# Patient Record
Sex: MALE | Race: WHITE | NOT HISPANIC OR LATINO | URBAN - METROPOLITAN AREA
[De-identification: names, ages, dates, MRNs, and addresses within clinical notes are randomized per-mention and may not be internally consistent; named-entity substitution may affect disease eponyms.]

---

## 2021-07-03 ENCOUNTER — EMERGENCY (EMERGENCY)
Facility: HOSPITAL | Age: 61
LOS: 1 days | Discharge: HOME | End: 2021-07-03
Attending: EMERGENCY MEDICINE
Payer: COMMERCIAL

## 2021-07-03 VITALS
DIASTOLIC BLOOD PRESSURE: 92 MMHG | TEMPERATURE: 98 F | HEIGHT: 67 IN | RESPIRATION RATE: 18 BRPM | HEART RATE: 94 BPM | SYSTOLIC BLOOD PRESSURE: 164 MMHG | OXYGEN SATURATION: 99 % | WEIGHT: 164.91 LBS

## 2021-07-03 LAB
ALBUMIN SERPL ELPH-MCNC: 4.1 G/DL — SIGNIFICANT CHANGE UP (ref 3.3–5)
ALP SERPL-CCNC: 94 U/L — SIGNIFICANT CHANGE UP (ref 40–120)
ALT FLD-CCNC: 13 U/L — SIGNIFICANT CHANGE UP (ref 10–45)
ANION GAP SERPL CALC-SCNC: 12 MMOL/L — SIGNIFICANT CHANGE UP (ref 5–17)
APTT BLD: 36.1 SEC — HIGH (ref 27.5–35.5)
AST SERPL-CCNC: 12 U/L — SIGNIFICANT CHANGE UP (ref 10–40)
B BURGDOR C6 AB SER-ACNC: NEGATIVE — SIGNIFICANT CHANGE UP
B BURGDOR IGG+IGM SER-ACNC: 0.07 INDEX — SIGNIFICANT CHANGE UP (ref 0.01–0.89)
BASE EXCESS BLDV CALC-SCNC: 2.9 MMOL/L — HIGH (ref -2–2)
BASOPHILS # BLD AUTO: 0.02 K/UL — SIGNIFICANT CHANGE UP (ref 0–0.2)
BASOPHILS NFR BLD AUTO: 0.2 % — SIGNIFICANT CHANGE UP (ref 0–2)
BILIRUB SERPL-MCNC: 0.3 MG/DL — SIGNIFICANT CHANGE UP (ref 0.2–1.2)
BUN SERPL-MCNC: 17 MG/DL — SIGNIFICANT CHANGE UP (ref 7–23)
CA-I SERPL-SCNC: 1.17 MMOL/L — SIGNIFICANT CHANGE UP (ref 1.12–1.3)
CALCIUM SERPL-MCNC: 9 MG/DL — SIGNIFICANT CHANGE UP (ref 8.4–10.5)
CHLORIDE BLDV-SCNC: 107 MMOL/L — SIGNIFICANT CHANGE UP (ref 96–108)
CHLORIDE SERPL-SCNC: 106 MMOL/L — SIGNIFICANT CHANGE UP (ref 96–108)
CO2 BLDV-SCNC: 30 MMOL/L — SIGNIFICANT CHANGE UP (ref 22–30)
CO2 SERPL-SCNC: 22 MMOL/L — SIGNIFICANT CHANGE UP (ref 22–31)
CREAT SERPL-MCNC: 0.73 MG/DL — SIGNIFICANT CHANGE UP (ref 0.5–1.3)
EOSINOPHIL # BLD AUTO: 0.16 K/UL — SIGNIFICANT CHANGE UP (ref 0–0.5)
EOSINOPHIL NFR BLD AUTO: 1.5 % — SIGNIFICANT CHANGE UP (ref 0–6)
ETHANOL SERPL-MCNC: SIGNIFICANT CHANGE UP MG/DL (ref 0–10)
GAS PNL BLDV: 140 MMOL/L — SIGNIFICANT CHANGE UP (ref 135–145)
GAS PNL BLDV: SIGNIFICANT CHANGE UP
GAS PNL BLDV: SIGNIFICANT CHANGE UP
GLUCOSE BLDV-MCNC: 78 MG/DL — SIGNIFICANT CHANGE UP (ref 70–99)
GLUCOSE SERPL-MCNC: 85 MG/DL — SIGNIFICANT CHANGE UP (ref 70–99)
HCO3 BLDV-SCNC: 28 MMOL/L — SIGNIFICANT CHANGE UP (ref 21–29)
HCT VFR BLD CALC: 41.7 % — SIGNIFICANT CHANGE UP (ref 39–50)
HCT VFR BLDA CALC: 45 % — SIGNIFICANT CHANGE UP (ref 39–50)
HGB BLD CALC-MCNC: 14.6 G/DL — SIGNIFICANT CHANGE UP (ref 13–17)
HGB BLD-MCNC: 13.8 G/DL — SIGNIFICANT CHANGE UP (ref 13–17)
IMM GRANULOCYTES NFR BLD AUTO: 0.7 % — SIGNIFICANT CHANGE UP (ref 0–1.5)
INR BLD: 0.97 RATIO — SIGNIFICANT CHANGE UP (ref 0.88–1.16)
LACTATE BLDV-MCNC: 1.3 MMOL/L — SIGNIFICANT CHANGE UP (ref 0.7–2)
LYMPHOCYTES # BLD AUTO: 1.98 K/UL — SIGNIFICANT CHANGE UP (ref 1–3.3)
LYMPHOCYTES # BLD AUTO: 18.4 % — SIGNIFICANT CHANGE UP (ref 13–44)
MCHC RBC-ENTMCNC: 31.3 PG — SIGNIFICANT CHANGE UP (ref 27–34)
MCHC RBC-ENTMCNC: 33.1 GM/DL — SIGNIFICANT CHANGE UP (ref 32–36)
MCV RBC AUTO: 94.6 FL — SIGNIFICANT CHANGE UP (ref 80–100)
MONOCYTES # BLD AUTO: 0.73 K/UL — SIGNIFICANT CHANGE UP (ref 0–0.9)
MONOCYTES NFR BLD AUTO: 6.8 % — SIGNIFICANT CHANGE UP (ref 2–14)
NEUTROPHILS # BLD AUTO: 7.82 K/UL — HIGH (ref 1.8–7.4)
NEUTROPHILS NFR BLD AUTO: 72.4 % — SIGNIFICANT CHANGE UP (ref 43–77)
NRBC # BLD: 0 /100 WBCS — SIGNIFICANT CHANGE UP (ref 0–0)
PCO2 BLDV: 48 MMHG — SIGNIFICANT CHANGE UP (ref 35–50)
PH BLDV: 7.39 — SIGNIFICANT CHANGE UP (ref 7.35–7.45)
PLATELET # BLD AUTO: 265 K/UL — SIGNIFICANT CHANGE UP (ref 150–400)
PO2 BLDV: 33 MMHG — SIGNIFICANT CHANGE UP (ref 25–45)
POTASSIUM BLDV-SCNC: 4.1 MMOL/L — SIGNIFICANT CHANGE UP (ref 3.5–5.3)
POTASSIUM SERPL-MCNC: 4.2 MMOL/L — SIGNIFICANT CHANGE UP (ref 3.5–5.3)
POTASSIUM SERPL-SCNC: 4.2 MMOL/L — SIGNIFICANT CHANGE UP (ref 3.5–5.3)
PROT SERPL-MCNC: 7.4 G/DL — SIGNIFICANT CHANGE UP (ref 6–8.3)
PROTHROM AB SERPL-ACNC: 11.6 SEC — SIGNIFICANT CHANGE UP (ref 10.6–13.6)
RBC # BLD: 4.41 M/UL — SIGNIFICANT CHANGE UP (ref 4.2–5.8)
RBC # FLD: 14.9 % — HIGH (ref 10.3–14.5)
SAO2 % BLDV: 62 % — LOW (ref 67–88)
SODIUM SERPL-SCNC: 140 MMOL/L — SIGNIFICANT CHANGE UP (ref 135–145)
TROPONIN T, HIGH SENSITIVITY RESULT: <6 NG/L — SIGNIFICANT CHANGE UP (ref 0–51)
WBC # BLD: 10.79 K/UL — HIGH (ref 3.8–10.5)
WBC # FLD AUTO: 10.79 K/UL — HIGH (ref 3.8–10.5)

## 2021-07-03 PROCEDURE — 70498 CT ANGIOGRAPHY NECK: CPT | Mod: 26,MA

## 2021-07-03 PROCEDURE — 85018 HEMOGLOBIN: CPT

## 2021-07-03 PROCEDURE — 71045 X-RAY EXAM CHEST 1 VIEW: CPT

## 2021-07-03 PROCEDURE — 71045 X-RAY EXAM CHEST 1 VIEW: CPT | Mod: 26

## 2021-07-03 PROCEDURE — 86140 C-REACTIVE PROTEIN: CPT

## 2021-07-03 PROCEDURE — 93005 ELECTROCARDIOGRAM TRACING: CPT

## 2021-07-03 PROCEDURE — 80053 COMPREHEN METABOLIC PANEL: CPT

## 2021-07-03 PROCEDURE — 70496 CT ANGIOGRAPHY HEAD: CPT

## 2021-07-03 PROCEDURE — 82435 ASSAY OF BLOOD CHLORIDE: CPT

## 2021-07-03 PROCEDURE — 85014 HEMATOCRIT: CPT

## 2021-07-03 PROCEDURE — 84132 ASSAY OF SERUM POTASSIUM: CPT

## 2021-07-03 PROCEDURE — 85610 PROTHROMBIN TIME: CPT

## 2021-07-03 PROCEDURE — 84295 ASSAY OF SERUM SODIUM: CPT

## 2021-07-03 PROCEDURE — 93010 ELECTROCARDIOGRAM REPORT: CPT

## 2021-07-03 PROCEDURE — 82330 ASSAY OF CALCIUM: CPT

## 2021-07-03 PROCEDURE — 80307 DRUG TEST PRSMV CHEM ANLYZR: CPT

## 2021-07-03 PROCEDURE — 70498 CT ANGIOGRAPHY NECK: CPT

## 2021-07-03 PROCEDURE — 86618 LYME DISEASE ANTIBODY: CPT

## 2021-07-03 PROCEDURE — 82962 GLUCOSE BLOOD TEST: CPT

## 2021-07-03 PROCEDURE — 87476 LYME DIS DNA AMP PROBE: CPT

## 2021-07-03 PROCEDURE — 82803 BLOOD GASES ANY COMBINATION: CPT

## 2021-07-03 PROCEDURE — 70496 CT ANGIOGRAPHY HEAD: CPT | Mod: 26,MA

## 2021-07-03 PROCEDURE — 83605 ASSAY OF LACTIC ACID: CPT

## 2021-07-03 PROCEDURE — 99291 CRITICAL CARE FIRST HOUR: CPT | Mod: 25

## 2021-07-03 PROCEDURE — 84484 ASSAY OF TROPONIN QUANT: CPT

## 2021-07-03 PROCEDURE — 82947 ASSAY GLUCOSE BLOOD QUANT: CPT

## 2021-07-03 PROCEDURE — 85025 COMPLETE CBC W/AUTO DIFF WBC: CPT

## 2021-07-03 PROCEDURE — 85730 THROMBOPLASTIN TIME PARTIAL: CPT

## 2021-07-03 PROCEDURE — 99291 CRITICAL CARE FIRST HOUR: CPT

## 2021-07-03 PROCEDURE — 70450 CT HEAD/BRAIN W/O DYE: CPT

## 2021-07-03 RX ORDER — VALACYCLOVIR 500 MG/1
1000 TABLET, FILM COATED ORAL ONCE
Refills: 0 | Status: COMPLETED | OUTPATIENT
Start: 2021-07-03 | End: 2021-07-03

## 2021-07-03 RX ORDER — VALACYCLOVIR 500 MG/1
1 TABLET, FILM COATED ORAL
Qty: 21 | Refills: 0
Start: 2021-07-03 | End: 2021-07-09

## 2021-07-03 RX ADMIN — VALACYCLOVIR 1000 MILLIGRAM(S): 500 TABLET, FILM COATED ORAL at 12:20

## 2021-07-03 RX ADMIN — Medication 60 MILLIGRAM(S): at 12:19

## 2021-07-03 NOTE — ED PROVIDER NOTE - PROGRESS NOTE DETAILS
Stevie RUBIN (PGY-3): Discussed with patient and daughter at bedside instructions to take prescribed valtrex and prednisone and follow-up with Dr. Gusman. Patient endorses he has eye patch at home 2/2 hx of cataracts. Addressed all questions and concerns at this time. Patient stable for discharge.

## 2021-07-03 NOTE — ED PROVIDER NOTE - PATIENT PORTAL LINK FT
You can access the FollowMyHealth Patient Portal offered by [facility] by registering at the following website: [email or web address]. By joining [facility]’s FollowMyHealth portal, you will also be able to view your health information using other applications (apps) compatible with our system.

## 2021-07-03 NOTE — ED PROVIDER NOTE - ATTENDING CONTRIBUTION TO CARE
Attending Statement (HIEU Elmore MD):    HPI: 62y/o M with h/o HTN, smoker, presenting for evaluation of right sided facial droop, first noted yesterday around 11am, states awoke fine but noted he had trouble smoking a cigarette and right side of face felt weird.  Not improving since onset, so brought to ED.    Review of Systems:  -CODE STROKE    All else negative unless otherwise specified elsewhere in this note.    PSH/PMH as noted above    On Physical Exam:  General: well appearing, in NAD, speaking clearly in full sentences and without difficulty; cooperative with exam  HEENT: PERRL, airway patent. TM normal bilaterall (some cerumen; no vesicles noted b/l in aud canals)  Neck: no neck tenderness, no nuchal rigidity  Cardiac: normal s1, s2; RRR; no MGR  Lungs: CTABL  Abdomen: soft nontender/nondistended  : no bladder tenderness or distension  Skin: intact, no rash  Extremities: no peripheral edema, no gross deformities  Neuro: R facial droop, EOMI; R forehead unable to raise eyebrows/wrinkle; 5/5 str in all extremities, grosly normal sensation in all extremities    MDM: Attending Statement (HIEU Elmore MD):    HPI: 62y/o M with h/o HTN, smoker, presenting for evaluation of right sided facial droop, first noted yesterday around 11am, states awoke fine but noted he had trouble smoking a cigarette and right side of face felt weird.  Not improving since onset, so brought to ED.    Review of Systems:  -CODE STROKE    All else negative unless otherwise specified elsewhere in this note.    PSH/PMH as noted above    On Physical Exam:  General: well appearing, in NAD, speaking clearly in full sentences and without difficulty; cooperative with exam  HEENT: PERRL, airway patent. TM normal bilaterall (some cerumen; no vesicles noted b/l in aud canals)  Neck: no neck tenderness, no nuchal rigidity  Cardiac: normal s1, s2; RRR; no MGR  Lungs: CTABL  Abdomen: soft nontender/nondistended  : no bladder tenderness or distension  Skin: intact, no rash  Extremities: no peripheral edema, no gross deformities  Neuro: R facial droop (including forehead), EOMI; R forehead unable to raise eyebrows/wrinkle; 5/5 str in all extremities, grosly normal sensation in all extremities    MDM: CODE STROKE from triage; evaluated in CT with neuro/stroke team who responded immediately; CT show no evidence of acute infarct or hemorrhage; based on CT and physical exam, most consistent with peripheral nerve d/o / Bell's Palsy; no ear findings to suggest Hyde Bowles.  Labs reviewed, no acute findings (labs ordered as per stroke code order set; specifically no significant renal dysfunction).  Per d/w neuro, agree with assessment of Bell's and plan for dc on steroids/valtrex, additional labs (ESR, CRP and lyme titer) ordered at recommendation of neuro team for neuro f/u.  Results of ED evaluation including labs/imaging d/w patient, who verbalizes understanding of ED evaluation and discharge plan including medications (prednisone, valtrex, OTC eye drops and eye patch), follow-up instructions and return precautions.

## 2021-07-03 NOTE — ED PROVIDER NOTE - CLINICAL SUMMARY MEDICAL DECISION MAKING FREE TEXT BOX
61y M w/ PMHx HTN presenting to ED complaining of R-sided facial droop. Upon initial assessment, patient has R-sided facial droop w/ sparing of wrinkling of R forehead, c/f Bell's Palsy. Will obtain labs, CT/CTA head, appreciate Neuro recs, low suspicion for ICH from head injury on Tuesday. Will closely monitor.

## 2021-07-03 NOTE — CONSULT NOTE ADULT - ASSESSMENT
61 year old right handed male with a PMH of hypertension, current smoker, last known well 11 AM yesterday morning, presented to ED from urgent care center this AM with complaint of right facial droop and acute change in his speech due to concern for stroke. Stroke code called at 10:50. Non-contrast CTH negative.    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     Recommendations:   [] No further inpatient neurologic workup indicated. Patient is neurologically cleared for discharge.  [x] CTH/CTA H/N: unremarkable.  [] Dysphagia screening.  [] Send Lyme, A1c, basic set of labs, ESR, CRP.  [] Prednisone 60MG PO daily x 7days + Valtrex 1G PO TID x 7 days.  [] Eye patch (especially at night) to prevent corneal scratching, OTC natural tears for dry eye.  [] Patient counselled to return to the ED immediately for:  [] Patient can follow up with general neurology at [address] [place] [address] 2-4 weeks after discharge. Please instruct the patient to call [phone] to schedule this appointment.    61 year old right handed male with a PMH of hypertension, current smoker, last known well 11 AM yesterday morning, presented to ED from urgent care center this AM with complaint of right facial droop and acute change in his speech due to concern for stroke. Stroke code called at 10:50. Non-contrast CTH negative.    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     Recommendations:   [] No further inpatient neurologic workup indicated. Patient is neurologically cleared for discharge.  [x] CTH/CTA H/N: unremarkable.  [] Dysphagia screening.  [] Send Lyme, A1c, basic set of labs, ESR, CRP.  [] Prednisone 60MG PO daily x 7days + Valtrex 1G PO TID x 7 days.  [] Eye patch (especially at night) to prevent corneal scratching, OTC natural tears for dry eye.  [] Patient can follow up with general neurology at [address] [place] [address] 2-4 weeks after discharge. Please instruct the patient to call [phone] to schedule this appointment.     Case discussed with Neurology attending Dr. Anton Guardado.

## 2021-07-03 NOTE — ED PROVIDER NOTE - NSFOLLOWUPINSTRUCTIONS_ED_ALL_ED_FT
- Lab and imaging results, if performed, were discussed with you along with your discharge diagnosis    - Please call to schedule follow-up appointment with Dr. Gusman (Neurology) at [address] [place] [address] in 2-4 weeks. Please call [phone] to schedule your appointment.     -Use your eye-patch at home (especially at night) to prevent corneal scratching, you should also use natural tears for dry eye, which can be purchased over the counter.     - Return to the ED for any new, worsening, or concerning symptoms to you including worsening weakness, new fever, chills    - Continue all prescribed medications, including your prednisone and valtrex. Please finish these take full course of medications    - Rest and keep yourself hydrated with fluids
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
## 2021-07-03 NOTE — ED PROVIDER NOTE - PHYSICAL EXAMINATION
Physical Exam:  Gen: NAD, AOx3, able to ambulate without assistance  Head: NCAT  HEENT: EOMI, PEERL ,tongue midline, oral mucosa moist  Lung: CTAB, no respiratory distress, no wheezes/rhonchi/rales B/L, speaking in full sentences  CV: RRR, no murmurs, rubs or gallops  Abd: soft, NT, ND, no guarding, no rigidity, no rebound tenderness, no CVA tenderness   MSK: no visible deformities, ROM normal in UE/LE, no back pain  Neuro: +R-sided facial droop with sparing of wrinkling of R forehead, A&Ox4, MS +5/5 in UE and LE BL, finger to nose smooth and rapid, gross sensation intact in UE and LE BL, gait smooth and coordinated, negative rhomberg, negative pronator drift  Skin: Warm, well perfused, no rash, no leg swelling  Psych: normal affect, calm  Zaira Hubbard D.O. -Resident
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
## 2021-07-03 NOTE — CONSULT NOTE ADULT - SUBJECTIVE AND OBJECTIVE BOX
MARIBEL MUNOZ  Male  MRN-[id number]    HPI:  61 year old right handed male with a PMH of hypertension, current smoker, sent from urgent care center due to concern for stroke. Last known well at 11 AM yesterday morning, after which he noted an acute change in his speech. Presented to urgent care center this AM as symptom was persistent and was noted to have a facial droop, and subsequently presented to ED due to concern for stroke. Stroke code called at 10:50. Non-contrast CTH negative.    NIHSS: 2  MRS: 0    Not a candidate for tPA as outside the therapeutic window. Not a candidate for mechanical thrombectomy as no LVO on imaging.      ROS: All negative except as mentioned in HPI.    PAST MEDICAL & SURGICAL HISTORY:  HTN    Allergies  Allergy Status Unknown    Vital Signs Last 24 Hrs  T(C): 36.6 (03 Jul 2021 10:49), Max: 36.6 (03 Jul 2021 10:49)  T(F): 97.8 (03 Jul 2021 10:49), Max: 97.8 (03 Jul 2021 10:49)  HR: 94 (03 Jul 2021 10:49) (94 - 94)  BP: 164/92 (03 Jul 2021 10:49) (164/92 - 164/92)  RR: 18 (03 Jul 2021 10:49) (18 - 18)  SpO2: 99% (03 Jul 2021 10:49) (99% - 99%)    General exam:  Constitutional: Sitting in wheelchair, no apparent distress.   Head: Normocephalic, atraumatic.    Neuro Exam:   MS: Alert, oriented to self, location, age, month, year. Fluent though self reports mild slurred speech. Able to repeat and name simple objects, and follows commands.   CN: VFF. EOMI. V1-V3 intact. Mild right facial droop. Decreased frequency of blinks OD, decreased eye closure strength OD. Tongue midline.   Motor: All extremities antigravity without drift.   Sensory: Intact to LT throughout. No extinction.   Coordination: No dysmetria on FNF or on HTS bilaterally   Gait: Deferred    LABS:                          13.8   10.79 )-----------( 265      ( 03 Jul 2021 10:59 )             41.7           PT/INR - ( 03 Jul 2021 10:59 )   PT: 11.6 sec;   INR: 0.97 ratio         PTT - ( 03 Jul 2021 10:59 )  PTT:36.1 sec    RADIOLOGY:      CT brain (7/3/21): No acute intracranial hemorrhage, mass effect, midline shift.  CTA neck and brain (7/3/21): No vessel occlusion or significant stenosis.

## 2021-07-03 NOTE — ED PROVIDER NOTE - NS ED ROS FT
CONSTITUTIONAL: No fevers, no chills, no lightheadedness, no dizziness  EYES: no visual changes, no eye pain  EARS: no change in hearing  CV: No chest pain, no palpitations  RESP: No SOB, no cough  GI: No n/v/d, no abd pain  : no dysuria, no hematuria, no flank pain  MSK: no back pain, no extremity pain  SKIN: no rashes, no bruises   NEURO: +R-sided facial droop, no headache, no focal weakness, no paresthesias   PSYCHIATRIC: no known mental health issues

## 2021-07-03 NOTE — ED ADULT NURSE NOTE - OBJECTIVE STATEMENT
60 yo M Pt here from Triage c/o of Right side facial droop.  last known normal yest at 11am. onset of symptoms yesterday. FS 90  Presents Aaox4 with R. facial droop and no weakness throughout his body. No other neuro deficits noted. Able to move upper and lower extremities.  Sensations intact throughout. Denies CP dizziness or SOB. PMH HTN. Large bore IV placed labs drawn and sent. MD and Neuro team at bedside. Pt transported. CT on Transport monitor.

## 2021-07-08 LAB — B BURGDOR DNA SPEC QL NAA+PROBE: NEGATIVE — SIGNIFICANT CHANGE UP
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
## 2022-11-09 NOTE — STROKE CODE NOTE - NIH STROKE SCALE: 5B. MOTOR ARM, RIGHT, QM
I spoke to the patient. He has prediabetes with an A1c of 6.0. I told him to improve his diet and lose weight.     Jacob Sun, DO
(0) No drift; limb holds 90 (or 45) degrees for full 10 secs

## 2023-07-14 PROBLEM — Z00.00 ENCOUNTER FOR PREVENTIVE HEALTH EXAMINATION: Status: ACTIVE | Noted: 2023-07-14

## 2023-07-25 ENCOUNTER — APPOINTMENT (OUTPATIENT)
Dept: ORTHOPEDIC SURGERY | Facility: CLINIC | Age: 63
End: 2023-07-25
Payer: MEDICAID

## 2023-07-25 VITALS
HEART RATE: 76 BPM | WEIGHT: 163 LBS | DIASTOLIC BLOOD PRESSURE: 90 MMHG | BODY MASS INDEX: 25.58 KG/M2 | SYSTOLIC BLOOD PRESSURE: 139 MMHG | HEIGHT: 67 IN

## 2023-07-25 DIAGNOSIS — M25.562 PAIN IN LEFT KNEE: ICD-10-CM

## 2023-07-25 PROCEDURE — 20610 DRAIN/INJ JOINT/BURSA W/O US: CPT | Mod: LT

## 2023-07-25 PROCEDURE — 99203 OFFICE O/P NEW LOW 30 MIN: CPT | Mod: 25

## 2023-07-25 PROCEDURE — 73562 X-RAY EXAM OF KNEE 3: CPT | Mod: LT

## 2023-07-25 NOTE — PROCEDURE
[Injection] : Injection [Left] : of the left [Knee Joint] : knee joint [Effusion] : Effusion [Osteoarthritis] : Osteoarthritis [Inflammation] : Inflammation [Diagnostic] : Diagnostic [Joint Pain] : Joint pain [Patient] : patient [Risk] : risk [Benefits] : benefits [Alternatives] : alternatives [Bleeding] : bleeding [Infection] : infection [Allergic Reaction] : allergic reaction [Verbal Consent Obtained] : verbal consent was obtained prior to the procedure [Ethyl Chloride Spray] : ethyl chloride spray was used as a topical anesthetic [Medial] : medial [22] : a 22-gauge [1% Lidocaine___(mL)] : [unfilled] mL of 1% Lidocaine [Methylpred. 40mg/mL___(mL)] : [unfilled] mL of 40mg/mL methylprednisolone [Bandage Applied] : a bandage [Tolerated Well] : The patient tolerated the procedure well [PRN] : as needed [FreeTextEntry1] : Chlorhexidine

## 2023-07-25 NOTE — HISTORY OF PRESENT ILLNESS
[de-identified] : The patient is a 63-year-old gentleman who presents with severe and debilitating left knee pain affecting his activities of daily living for many years getting progressively worse over the past several months.  Patient has difficulty standing for long peers of time, walking for long periods of time, negotiating stairs.  Over the years has had courses of physical therapy, cortisone injections and viscosupplementation injections all without relief.  Patient is going to [facility] tomorrow would like a cortisone injection however he would like to book left total knee replacement surgery in the fall 2023. [Worsening] : worsening [9] : a current pain level of 9/10 [7] : an average pain level of 7/10 [4] : a minimum pain level of 4/10 [10] : a maximum pain level of 10/10 [Standing] : standing [Bending] : worsened by bending [Direct Pressure] : worsened by direct pressure [Lifting] : worsened by lifting [Sitting] : worsened by sitting [Running] : worsened by running [Walking] : worsened by walking [Knee Flexion] : worsened with knee flexion [Knee Extension] : worsened with knee extension [Acetaminophen] : relieved by acetaminophen [Exercise Regimen] : relieved by exercise regimen [Heat] : relieved by heat [Ice] : relieved by ice [NSAIDs] : relieved by nonsteroidal anti-inflammatory drugs [Physical Therapy] : relieved by physical therapy [Rest] : relieved by rest [Ataxia] : no ataxia [Incontinence] : no incontinence [Urinary Ret.] : no urinary retention [Loss of Dexterity] : good dexterity

## 2023-07-25 NOTE — PHYSICAL EXAM
[Antalgic] : antalgic [Wide-Based] : wide-based [UE/LE] : Sensory: Intact in bilateral upper & lower extremities [ALL] : dorsalis pedis, posterior tibial, femoral, popliteal, and radial 2+ and symmetric bilaterally [Normal RUE] : Right Upper Extremity: No scars, rashes, lesions, ulcers, skin intact [Normal LUE] : Left Upper Extremity: No scars, rashes, lesions, ulcers, skin intact [Normal RLE] : Right Lower Extremity: No scars, rashes, lesions, ulcers, skin intact [Normal] : No costovertebral angle tenderness and no spinal tenderness [de-identified] : General/Constitutional: Well appearing, 63year old male in no apparent distress; height and weight as detailed below; vital signs as detailed below\par Neuro:\par Orientation/Mental Status: Awake, alert, oriented to time, place, & person.\par Balance: Single leg balance intact on Left / Right @ 10 sec.\par Sensation: intact to fine & deep touch bilat. Feet/toes; \par EHL/AT(Peroneal N.): Bilat: 5/5\par \par Vascular: DP: Bilat: 2+\par Cap refill 1-2 sec. all toes\par Calves non tender bilat; No Nila's Sx Bilat.\par Lymph: No enlarged LN in the popliteal chain bilaterally.\par Skin(LE): No cellulitis, edema, and min. venous varicosities bilaterally \par MS:\par Gait: Antalgic gait to the left\par Stable heel/toe stride w/o device\par Left-sided] gonalgic limp using a [cane at times\par Function: There is moderate difficulty mounting the exam table. \par \par Hips: Both hips have full ROM without stiffness or pain in hip flexion, extension, external rotation, and abduction. No klunk/instability noted bilaterally with ROM exam. No palpable tenderness in the trochanteric bursa bilat. Hip flexion/extension strength was 5/5 bilaterally.\par Left Knee: Skin is clean, dry, intact\par Swelling: Minimal\par Effusion: Minimal\par Alignment: 25 degree varus\par Tenderness: Medial and posterior\par Incision: No visible incisions\par Skin Temp: Warm to touch\par ROM: Flexion 95] deg.; Extension: -5] deg,\par Laxity A-P: Negative anterior posterior drawer\par Laxity M-L: Less than 5 degree laxity with varus valgus stresses\par PF crepitus: 2+ with pain\par Quadricep formation: I/ attenuated in Extension & Flexion:\par Quad/Ham St: 5/5\par \par Right  Knee: Skin is clean, dry, intact\par Swelling: No swelling\par Effusion: No effusion\par Alignment: Slightly varus\par Tenderness: No joint line tenderness\par Incision: No visible incisions\par Skin Temp: Warm to touch\par ROM: Flexion: 125] deg.; Extension: [0 deg,\par Laxity A-P: Negative anterior posterior drawer\par Laxity M-L: Less than 5 degree laxity with varus valgus stresses\par PF crepitus: 1+ without pain\par Quadricep formation: Intact /  in Extension & Flexion:\par Quad/Ham St: 5/5\par \par  [de-identified] : Intact [de-identified] : Severe tricompartmental DJD to the left knee with multiple subchondral cyst, periarticular osteophytes, bone-on-bone contact medial femoral-tibial compartment patellofemoral joint

## 2023-12-01 NOTE — DISCUSSION/SUMMARY
----- Message from JAREK Aguilar sent at 12/1/2023 12:34 PM CST -----  I called her   [Medication Risks Reviewed] : Medication risks reviewed [Surgical risks reviewed] : Surgical risks reviewed [de-identified] : The patient has severe tricompartmental DJD to the left knee affecting his activities of daily living for many years getting progressively worse over the past several months.  We did give him a cortisone injection today to temporize his symptoms for severe DJD.  The patient would like to book left total knee replacement surgery in and around October 2023 and he was thoroughly educated on the process\par The patient is a 63-year-old gentleman with bone on bone arthritis of thei left knee . Based upon the patients continued symptoms and failure to respond to conservative treatment I have recommended a left total knee replacement for this patient. A long discussion took place with the patient describing what a total joint replacement is and what the procedure would entail. A total knee model, similar to the implant that will be used during the operation was utilized to demonstrate and to discuss the various bearing surfaces of the implants. The hospitalization and post-operative care and rehabilitation were also discussed. The use of perioperative antibiotics and DVT prophylaxis were discussed. The risk, benefits and alternatives to a surgical intervention were discussed at length with the patient. The patient was also advised of risks related to the medical comorbidities and elevated body mass index (BMI).  A lengthy discussion took place to review the most common complications including but not limited to: deep vein thrombosis, pulmonary embolus, heart attack, stroke, infection, wound breakdown, numbness, damage to nerves, tendon, muscles, arteries or other blood vessels, death and other possible complications from anesthesia. The patient was told that we will take steps to minimize these risks by using sterile technique, antibiotics and DVT prophylaxis when appropriate and follow the patient postoperatively in the office setting to monitor progress. The possibility of recurrent pain, no improvement in pain and actual worsening of pain were also discussed with the patient. \par \par The patient was advised of the goals, alternatives, risks and benefits of autologous, directed and banked blood product transfusions for perioperative blood losses.\par The discharge plan of care focused on the patient going home following surgery.  I encouraged the patient to make the necessary arrangements to have someone stay with them when they are discharged home.  Following discharge, a home care nurse will visit the patient.  He/she will open your home care case and request home physical therapy services.  Home physical therapy will commence following discharge provided it is appropriate and covered by his health insurance benefit plan.  \par \par The patient was advised that they will require a medical preoperative risk evaluation by their PCP. Further medical subspecialty clearances such as cardiac may be indicated if felt needed by their PCP.\par \par The benefits of surgery were discussed with the patient including the potential for improving his/her current clinical condition through operative intervention. Alternatives to surgical intervention including continued conservative management were also discussed in detail. All questions were answered to the satisfaction of the patient. The treatment plan of care, as well as a model of a total knee equivalent to the one that will be used for their total joint replacement, was shared with the patient.  The patient agreed to the plan of care as well as the use of implants in their total joint replacement.\par The patient participated in an interactive discussion of the left TKR implant planned for their surgery with questions answered, agreed with the treatment plan, and has decided to move forward with elective left TKR as planned.\par \par

## 2024-01-23 ENCOUNTER — NON-APPOINTMENT (OUTPATIENT)
Age: 64
End: 2024-01-23

## 2024-01-23 ENCOUNTER — APPOINTMENT (OUTPATIENT)
Dept: ORTHOPEDIC SURGERY | Facility: CLINIC | Age: 64
End: 2024-01-23
Payer: MEDICARE

## 2024-01-23 VITALS
BODY MASS INDEX: 26.21 KG/M2 | HEART RATE: 73 BPM | SYSTOLIC BLOOD PRESSURE: 122 MMHG | WEIGHT: 167 LBS | HEIGHT: 67 IN | DIASTOLIC BLOOD PRESSURE: 80 MMHG

## 2024-01-23 DIAGNOSIS — M17.12 UNILATERAL PRIMARY OSTEOARTHRITIS, LEFT KNEE: ICD-10-CM

## 2024-01-23 PROCEDURE — 99214 OFFICE O/P EST MOD 30 MIN: CPT | Mod: 25

## 2024-01-23 PROCEDURE — 73562 X-RAY EXAM OF KNEE 3: CPT | Mod: LT

## 2024-01-23 NOTE — PHYSICAL EXAM
[de-identified] : General/Constitutional: Well appearing, 63year old male in no apparent distress; height and weight as detailed below; vital signs as detailed below  Neuro:  Orientation/Mental Status: Awake, alert, oriented to time, place, & person.  Balance: Single leg balance intact on Left / Right @ 10 sec.  Sensation: intact to fine & deep touch bilat. Feet/toes;  EHL/AT(Peroneal N.): Bilat: 5/5    Vascular: DP: Bilat: 2+  Cap refill 1-2 sec. all toes  Calves non tender bilat; No Nila's Sx Bilat.  Lymph: No enlarged LN in the popliteal chain bilaterally.  Skin(LE): No cellulitis, edema, and min. venous varicosities bilaterally  MS:  Gait: Antalgic gait to the left  Stable heel/toe stride w/o device  Left-sided] gonalgic limp using a [cane at times  Function: There is moderate difficulty mounting the exam table.    Hips: Both hips have full ROM without stiffness or pain in hip flexion, extension, external rotation, and abduction. No klunk/instability noted bilaterally with ROM exam. No palpable tenderness in the trochanteric bursa bilat. Hip flexion/extension strength was 5/5 bilaterally.  Left Knee: Skin is clean, dry, intact  Swelling: Minimal  Effusion: Minimal  Alignment: 25 degree varus  Tenderness: Medial and posterior  Incision: No visible incisions  Skin Temp: Warm to touch  ROM: Flexion 95] deg.; Extension: -5] deg,  Laxity A-P: Negative anterior posterior drawer  Laxity M-L: Less than 5 degree laxity with varus valgus stresses  PF crepitus: 2+ with pain  Quadricep formation: I/ attenuated in Extension & Flexion:  Quad/Ham St: 5/5    Right Knee: Skin is clean, dry, intact  Swelling: No swelling  Effusion: No effusion  Alignment: Slightly varus  Tenderness: No joint line tenderness  Incision: No visible incisions  Skin Temp: Warm to touch  ROM: Flexion: 125] deg.; Extension: [0 deg,  Laxity A-P: Negative anterior posterior drawer  Laxity M-L: Less than 5 degree laxity with varus valgus stresses  PF crepitus: 1+ without pain  Quadricep formation: Intact / in Extension & Flexion:  Quad/Ham St: 5/5 [de-identified] : 3 radiographs of the left knee were performed today in the [facility] office. Grade 4 bone-on-bone cartilaginous wear in all 3 compartments. Subchondral sclerosis noted on both sides the T-F  joint. Osteophytes are noted in all three compartments.

## 2024-01-23 NOTE — END OF VISIT
[FreeTextEntry3] : I Dr. Peralta, personally performed the evaluation and management services for this established patient who present today with a new problem/exacerbation of an existing condition. The E/M includes conducting the examination, assessing all new/exacerbated conditions, and establishing a new plan of care. Today, My ACP was here to assist in my evaluation and management services of this new problem/exacerbated condition to be followed going forward.

## 2024-01-23 NOTE — DISCUSSION/SUMMARY
[de-identified] : Medication risks reviewed. Surgical risks reviewed. The patient has severe tricompartmental DJD to the left knee affecting his activities of daily living for many years getting progressively worse over the past several months.  The patient is a 63-year-old gentleman with bone on bone arthritis of their left knee. Based upon the patients continued symptoms and failure to respond to conservative treatment I have recommended a left total knee replacement for this patient. A long discussion took place with the patient describing what a total joint replacement is and what the procedure would entail. A total knee model, similar to the implant that will be used during the operation was utilized to demonstrate and to discuss the various bearing surfaces of the implants. The hospitalization and post-operative care and rehabilitation were also discussed. The use of perioperative antibiotics and DVT prophylaxis were discussed. The risk, benefits and alternatives to a surgical intervention were discussed at length with the patient. The patient was also advised of risks related to the medical comorbidities and elevated body mass index (BMI). A lengthy discussion took place to review the most common complications including but not limited to: deep vein thrombosis, pulmonary embolus, heart attack, stroke, infection, wound breakdown, numbness, damage to nerves, tendon, muscles, arteries or other blood vessels, death and other possible complications from anesthesia. The patient was told that we will take steps to minimize these risks by using sterile technique, antibiotics and DVT prophylaxis when appropriate and follow the patient postoperatively in the office setting to monitor progress. The possibility of recurrent pain, no improvement in pain and actual worsening of pain were also discussed with the patient.    The patient was advised of the goals, alternatives, risks and benefits of autologous, directed and banked blood product transfusions for perioperative blood losses.  The discharge plan of care focused on the patient going home following surgery. I encouraged the patient to make the necessary arrangements to have someone stay with them when they are discharged home. Following discharge, a home care nurse will visit the patient. He/she will open your home care case and request home physical therapy services. Home physical therapy will commence following discharge provided it is appropriate and covered by his health insurance benefit plan.    The patient was advised that they will require a medical preoperative risk evaluation by their PCP. Further medical subspecialty clearances such as cardiac may be indicated if felt needed by their PCP.    The benefits of surgery were discussed with the patient including the potential for improving his/her current clinical condition through operative intervention. Alternatives to surgical intervention including continued conservative management were also discussed in detail. All questions were answered to the satisfaction of the patient. The treatment plan of care, as well as a model of a total knee equivalent to the one that will be used for their total joint replacement, was shared with the patient. The patient agreed to the plan of care as well as the use of implants in their total joint replacement.  The patient participated in an interactive discussion of the left TKR implant planned for their surgery with questions answered, agreed with the treatment plan, and has decided to move forward with elective left TKR as planned.

## 2024-01-23 NOTE — REASON FOR VISIT
[Follow-Up Visit] : a follow-up visit for [Osteoarthritis, Knee] : osteoarthritis of the knee [Knee Pain] : knee pain [Spouse] : spouse

## 2024-01-23 NOTE — HISTORY OF PRESENT ILLNESS
[de-identified] : The patient is a 63-year-old gentleman who presents with severe and debilitating left knee pain affecting his activities of daily living for many years getting progressively worse over the past several months. Patient has difficulty standing for long peers of time, walking for long periods of time, negotiating stairs. Over the years has had courses of physical therapy, cortisone injections and viscosupplementation injections all without relief. He is scheduled to have surgery next month.    He states the symptoms are worsening. Pain levels include a current pain level of 9/10, an average pain level of 7/10, a minimum pain level of 4/10 and a maximum pain level of 10/10.  His symptoms occur while walking, sitting and standing.    Modifying factors - worsened by bending, worsened by direct pressure, worsened by lifting, worsened by sitting, worsened by running, worsened by walking, worsened with knee flexion and worsened with knee extension. Relieving factors include relieved by acetaminophen, relieved by exercise regimen, relieved by heat, relieved by ice, relieved by nonsteroidal anti-inflammatory drugs, relieved by physical therapy and relieved by rest.   Associated Symptoms: no ataxia, no incontinence, good dexterity and no urinary retention.

## 2024-02-05 ENCOUNTER — OUTPATIENT (OUTPATIENT)
Dept: OUTPATIENT SERVICES | Facility: HOSPITAL | Age: 64
LOS: 1 days | End: 2024-02-05
Payer: COMMERCIAL

## 2024-02-05 VITALS
DIASTOLIC BLOOD PRESSURE: 86 MMHG | TEMPERATURE: 98 F | HEIGHT: 66.5 IN | RESPIRATION RATE: 16 BRPM | HEART RATE: 70 BPM | OXYGEN SATURATION: 98 % | SYSTOLIC BLOOD PRESSURE: 136 MMHG | WEIGHT: 170.2 LBS

## 2024-02-05 DIAGNOSIS — M17.12 UNILATERAL PRIMARY OSTEOARTHRITIS, LEFT KNEE: ICD-10-CM

## 2024-02-05 DIAGNOSIS — Z98.890 OTHER SPECIFIED POSTPROCEDURAL STATES: Chronic | ICD-10-CM

## 2024-02-05 DIAGNOSIS — F17.200 NICOTINE DEPENDENCE, UNSPECIFIED, UNCOMPLICATED: ICD-10-CM

## 2024-02-05 DIAGNOSIS — Z01.818 ENCOUNTER FOR OTHER PREPROCEDURAL EXAMINATION: ICD-10-CM

## 2024-02-05 DIAGNOSIS — K08.89 OTHER SPECIFIED DISORDERS OF TEETH AND SUPPORTING STRUCTURES: ICD-10-CM

## 2024-02-05 LAB
A1C WITH ESTIMATED AVERAGE GLUCOSE RESULT: 5.7 % — HIGH (ref 4–5.6)
ALBUMIN SERPL ELPH-MCNC: 3.5 G/DL — SIGNIFICANT CHANGE UP (ref 3.3–5)
ALP SERPL-CCNC: 86 U/L — SIGNIFICANT CHANGE UP (ref 30–120)
ALT FLD-CCNC: 20 U/L — SIGNIFICANT CHANGE UP (ref 10–60)
ANION GAP SERPL CALC-SCNC: 7 MMOL/L — SIGNIFICANT CHANGE UP (ref 5–17)
APTT BLD: 36.2 SEC — HIGH (ref 24.5–35.6)
AST SERPL-CCNC: 11 U/L — SIGNIFICANT CHANGE UP (ref 10–40)
BILIRUB SERPL-MCNC: 0.5 MG/DL — SIGNIFICANT CHANGE UP (ref 0.2–1.2)
BLD GP AB SCN SERPL QL: SIGNIFICANT CHANGE UP
BUN SERPL-MCNC: 20 MG/DL — SIGNIFICANT CHANGE UP (ref 7–23)
CALCIUM SERPL-MCNC: 8.9 MG/DL — SIGNIFICANT CHANGE UP (ref 8.4–10.5)
CHLORIDE SERPL-SCNC: 104 MMOL/L — SIGNIFICANT CHANGE UP (ref 96–108)
CO2 SERPL-SCNC: 27 MMOL/L — SIGNIFICANT CHANGE UP (ref 22–31)
CREAT SERPL-MCNC: 0.91 MG/DL — SIGNIFICANT CHANGE UP (ref 0.5–1.3)
EGFR: 94 ML/MIN/1.73M2 — SIGNIFICANT CHANGE UP
ESTIMATED AVERAGE GLUCOSE: 117 MG/DL — HIGH (ref 68–114)
GLUCOSE SERPL-MCNC: 115 MG/DL — HIGH (ref 70–99)
HCT VFR BLD CALC: 43.2 % — SIGNIFICANT CHANGE UP (ref 39–50)
HGB BLD-MCNC: 14.4 G/DL — SIGNIFICANT CHANGE UP (ref 13–17)
INR BLD: 1.01 RATIO — SIGNIFICANT CHANGE UP (ref 0.85–1.18)
MCHC RBC-ENTMCNC: 30.6 PG — SIGNIFICANT CHANGE UP (ref 27–34)
MCHC RBC-ENTMCNC: 33.3 GM/DL — SIGNIFICANT CHANGE UP (ref 32–36)
MCV RBC AUTO: 91.9 FL — SIGNIFICANT CHANGE UP (ref 80–100)
NRBC # BLD: 0 /100 WBCS — SIGNIFICANT CHANGE UP (ref 0–0)
PLATELET # BLD AUTO: 248 K/UL — SIGNIFICANT CHANGE UP (ref 150–400)
POTASSIUM SERPL-MCNC: 3.8 MMOL/L — SIGNIFICANT CHANGE UP (ref 3.5–5.3)
POTASSIUM SERPL-SCNC: 3.8 MMOL/L — SIGNIFICANT CHANGE UP (ref 3.5–5.3)
PROT SERPL-MCNC: 7.9 G/DL — SIGNIFICANT CHANGE UP (ref 6–8.3)
PROTHROM AB SERPL-ACNC: 11 SEC — SIGNIFICANT CHANGE UP (ref 9.5–13)
RBC # BLD: 4.7 M/UL — SIGNIFICANT CHANGE UP (ref 4.2–5.8)
RBC # FLD: 14.6 % — HIGH (ref 10.3–14.5)
SODIUM SERPL-SCNC: 138 MMOL/L — SIGNIFICANT CHANGE UP (ref 135–145)
WBC # BLD: 9.03 K/UL — SIGNIFICANT CHANGE UP (ref 3.8–10.5)
WBC # FLD AUTO: 9.03 K/UL — SIGNIFICANT CHANGE UP (ref 3.8–10.5)

## 2024-02-05 PROCEDURE — 93010 ELECTROCARDIOGRAM REPORT: CPT

## 2024-02-05 PROCEDURE — G0463: CPT

## 2024-02-05 PROCEDURE — 93005 ELECTROCARDIOGRAM TRACING: CPT

## 2024-02-05 NOTE — H&P PST ADULT - PROBLEM SELECTOR PLAN 1
LEFT Total Knee Replacement on 02/21/2024  Medical Clearance by PMD  NPO as per Anesthesia-Amlodipine on AM of surgery with Gatorade  Hold Diclofenace gel starting on 2/14.   Tylenol prn for pain  Instructions reviewed and questions addressed  Pt denies any metal allergies

## 2024-02-05 NOTE — H&P PST ADULT - HISTORY OF PRESENT ILLNESS
62yo male patient with approximately 2yr history of left knee pain. He has had Cortisone and gel injections and PT with no improvement. He rates the pain at 3/10. He is not taking anything for pain. He states that he cannot walk normally. TKR has been recommended and he present today for PSTs.

## 2024-02-05 NOTE — H&P PST ADULT - NSICDXFAMILYHX_GEN_ALL_CORE_FT
FAMILY HISTORY:  Mother  Still living? No  Family history of stomach cancer, Age at diagnosis: Age Unknown    Child  Still living? Yes, Estimated age: 41-50  Family history of diabetes mellitus in son, Age at diagnosis: Age Unknown

## 2024-02-05 NOTE — H&P PST ADULT - NSICDXPASTMEDICALHX_GEN_ALL_CORE_FT
PAST MEDICAL HISTORY:  Hyperlipidemia     Hypertension     Osteoarthritis      PAST MEDICAL HISTORY:  H/O Bell's palsy     Hyperlipidemia     Hypertension     Osteoarthritis     Smoking     Tooth loose

## 2024-02-05 NOTE — H&P PST ADULT - ENMT COMMENTS
partial upper denture. pt has a loose left upper side of mouth- extraction scheduled for today 2/5/24. poor dentition- loose tooth left upper

## 2024-02-05 NOTE — H&P PST ADULT - MUSCULOSKELETAL
details… left knee/no joint swelling/no joint erythema/no joint warmth/no calf tenderness/decreased ROM/decreased ROM due to pain/decreased strength/abnormal gait

## 2024-02-06 LAB
MRSA PCR RESULT.: SIGNIFICANT CHANGE UP
S AUREUS DNA NOSE QL NAA+PROBE: SIGNIFICANT CHANGE UP

## 2024-02-14 RX ORDER — CHLORHEXIDINE GLUCONATE 213 G/1000ML
1 SOLUTION TOPICAL ONCE
Refills: 0 | Status: DISCONTINUED | OUTPATIENT
Start: 2024-02-21 | End: 2024-02-22

## 2024-02-14 RX ORDER — APREPITANT 80 MG/1
40 CAPSULE ORAL ONCE
Refills: 0 | Status: DISCONTINUED | OUTPATIENT
Start: 2024-02-21 | End: 2024-02-22

## 2024-02-14 RX ORDER — ACETAMINOPHEN 500 MG
1000 TABLET ORAL ONCE
Refills: 0 | Status: DISCONTINUED | OUTPATIENT
Start: 2024-02-21 | End: 2024-02-22

## 2024-02-20 PROBLEM — Z86.69 PERSONAL HISTORY OF OTHER DISEASES OF THE NERVOUS SYSTEM AND SENSE ORGANS: Chronic | Status: ACTIVE | Noted: 2024-02-05

## 2024-02-20 PROBLEM — M19.90 UNSPECIFIED OSTEOARTHRITIS, UNSPECIFIED SITE: Chronic | Status: ACTIVE | Noted: 2024-02-05

## 2024-02-20 PROBLEM — E78.5 HYPERLIPIDEMIA, UNSPECIFIED: Chronic | Status: ACTIVE | Noted: 2024-02-05

## 2024-02-20 PROBLEM — I10 ESSENTIAL (PRIMARY) HYPERTENSION: Chronic | Status: ACTIVE | Noted: 2024-02-05

## 2024-02-21 ENCOUNTER — TRANSCRIPTION ENCOUNTER (OUTPATIENT)
Age: 64
End: 2024-02-21

## 2024-02-21 ENCOUNTER — INPATIENT (INPATIENT)
Facility: HOSPITAL | Age: 64
LOS: 0 days | Discharge: HOME | DRG: 470 | End: 2024-02-22
Attending: ORTHOPAEDIC SURGERY | Admitting: ORTHOPAEDIC SURGERY
Payer: COMMERCIAL

## 2024-02-21 ENCOUNTER — APPOINTMENT (OUTPATIENT)
Dept: ORTHOPEDIC SURGERY | Facility: HOSPITAL | Age: 64
End: 2024-02-21

## 2024-02-21 VITALS
TEMPERATURE: 98 F | RESPIRATION RATE: 13 BRPM | HEART RATE: 67 BPM | DIASTOLIC BLOOD PRESSURE: 80 MMHG | HEIGHT: 66 IN | SYSTOLIC BLOOD PRESSURE: 139 MMHG | WEIGHT: 165.57 LBS | OXYGEN SATURATION: 100 %

## 2024-02-21 DIAGNOSIS — Z98.890 OTHER SPECIFIED POSTPROCEDURAL STATES: Chronic | ICD-10-CM

## 2024-02-21 DIAGNOSIS — M17.12 UNILATERAL PRIMARY OSTEOARTHRITIS, LEFT KNEE: ICD-10-CM

## 2024-02-21 LAB
ABO RH CONFIRMATION: SIGNIFICANT CHANGE UP
APTT BLD: 33.5 SEC — SIGNIFICANT CHANGE UP (ref 24.5–35.6)

## 2024-02-21 PROCEDURE — 27447 TOTAL KNEE ARTHROPLASTY: CPT | Mod: LT

## 2024-02-21 PROCEDURE — 99222 1ST HOSP IP/OBS MODERATE 55: CPT

## 2024-02-21 PROCEDURE — 27447 TOTAL KNEE ARTHROPLASTY: CPT | Mod: AS,LT

## 2024-02-21 PROCEDURE — 73560 X-RAY EXAM OF KNEE 1 OR 2: CPT | Mod: 26,LT

## 2024-02-21 DEVICE — BONE WAX 2.5GM: Type: IMPLANTABLE DEVICE | Site: LEFT | Status: FUNCTIONAL

## 2024-02-21 DEVICE — IMPLANTABLE DEVICE: Type: IMPLANTABLE DEVICE | Site: LEFT | Status: FUNCTIONAL

## 2024-02-21 DEVICE — COMP FEM NON POROUS SZ 7 LT: Type: IMPLANTABLE DEVICE | Site: LEFT | Status: FUNCTIONAL

## 2024-02-21 DEVICE — INSERT TIB NONPOROUS UNIV SZ 8 LT: Type: IMPLANTABLE DEVICE | Site: LEFT | Status: FUNCTIONAL

## 2024-02-21 DEVICE — COMP PATELLA TRI-PEG E-PLUS POLY 9X35MM: Type: IMPLANTABLE DEVICE | Site: LEFT | Status: FUNCTIONAL

## 2024-02-21 RX ORDER — ATORVASTATIN CALCIUM 80 MG/1
40 TABLET, FILM COATED ORAL AT BEDTIME
Refills: 0 | Status: DISCONTINUED | OUTPATIENT
Start: 2024-02-21 | End: 2024-02-21

## 2024-02-21 RX ORDER — OXYCODONE HYDROCHLORIDE 5 MG/1
5 TABLET ORAL
Refills: 0 | Status: DISCONTINUED | OUTPATIENT
Start: 2024-02-21 | End: 2024-02-22

## 2024-02-21 RX ORDER — SODIUM CHLORIDE 9 MG/ML
1000 INJECTION, SOLUTION INTRAVENOUS
Refills: 0 | Status: DISCONTINUED | OUTPATIENT
Start: 2024-02-21 | End: 2024-02-21

## 2024-02-21 RX ORDER — SENNA PLUS 8.6 MG/1
2 TABLET ORAL AT BEDTIME
Refills: 0 | Status: DISCONTINUED | OUTPATIENT
Start: 2024-02-21 | End: 2024-02-22

## 2024-02-21 RX ORDER — TRANEXAMIC ACID 100 MG/ML
1000 INJECTION, SOLUTION INTRAVENOUS ONCE
Refills: 0 | Status: COMPLETED | OUTPATIENT
Start: 2024-02-21 | End: 2024-02-21

## 2024-02-21 RX ORDER — ASPIRIN/CALCIUM CARB/MAGNESIUM 324 MG
81 TABLET ORAL EVERY 12 HOURS
Refills: 0 | Status: DISCONTINUED | OUTPATIENT
Start: 2024-02-22 | End: 2024-02-22

## 2024-02-21 RX ORDER — AMLODIPINE BESYLATE 2.5 MG/1
1 TABLET ORAL
Refills: 0 | DISCHARGE

## 2024-02-21 RX ORDER — SENNA PLUS 8.6 MG/1
2 TABLET ORAL
Qty: 0 | Refills: 0 | DISCHARGE
Start: 2024-02-21

## 2024-02-21 RX ORDER — NICOTINE POLACRILEX 2 MG
1 GUM BUCCAL DAILY
Refills: 0 | Status: DISCONTINUED | OUTPATIENT
Start: 2024-02-21 | End: 2024-02-22

## 2024-02-21 RX ORDER — CEFAZOLIN SODIUM 1 G
2000 VIAL (EA) INJECTION EVERY 8 HOURS
Refills: 0 | Status: COMPLETED | OUTPATIENT
Start: 2024-02-21 | End: 2024-02-22

## 2024-02-21 RX ORDER — AMLODIPINE BESYLATE 2.5 MG/1
10 TABLET ORAL DAILY
Refills: 0 | Status: DISCONTINUED | OUTPATIENT
Start: 2024-02-23 | End: 2024-02-22

## 2024-02-21 RX ORDER — HYDROMORPHONE HYDROCHLORIDE 2 MG/ML
0.5 INJECTION INTRAMUSCULAR; INTRAVENOUS; SUBCUTANEOUS
Refills: 0 | Status: DISCONTINUED | OUTPATIENT
Start: 2024-02-21 | End: 2024-02-21

## 2024-02-21 RX ORDER — HYDROMORPHONE HYDROCHLORIDE 2 MG/ML
1 INJECTION INTRAMUSCULAR; INTRAVENOUS; SUBCUTANEOUS
Refills: 0 | Status: DISCONTINUED | OUTPATIENT
Start: 2024-02-21 | End: 2024-02-21

## 2024-02-21 RX ORDER — SODIUM CHLORIDE 9 MG/ML
1000 INJECTION, SOLUTION INTRAVENOUS
Refills: 0 | Status: DISCONTINUED | OUTPATIENT
Start: 2024-02-21 | End: 2024-02-22

## 2024-02-21 RX ORDER — POLYETHYLENE GLYCOL 3350 17 G/17G
17 POWDER, FOR SOLUTION ORAL AT BEDTIME
Refills: 0 | Status: DISCONTINUED | OUTPATIENT
Start: 2024-02-21 | End: 2024-02-22

## 2024-02-21 RX ORDER — SODIUM CHLORIDE 9 MG/ML
500 INJECTION INTRAMUSCULAR; INTRAVENOUS; SUBCUTANEOUS ONCE
Refills: 0 | Status: COMPLETED | OUTPATIENT
Start: 2024-02-21 | End: 2024-02-21

## 2024-02-21 RX ORDER — ACETAMINOPHEN 500 MG
1000 TABLET ORAL EVERY 6 HOURS
Refills: 0 | Status: COMPLETED | OUTPATIENT
Start: 2024-02-21 | End: 2024-02-22

## 2024-02-21 RX ORDER — PANTOPRAZOLE SODIUM 20 MG/1
40 TABLET, DELAYED RELEASE ORAL
Refills: 0 | Status: DISCONTINUED | OUTPATIENT
Start: 2024-02-21 | End: 2024-02-22

## 2024-02-21 RX ORDER — CELECOXIB 200 MG/1
200 CAPSULE ORAL EVERY 12 HOURS
Refills: 0 | Status: DISCONTINUED | OUTPATIENT
Start: 2024-02-21 | End: 2024-02-22

## 2024-02-21 RX ORDER — ACETAMINOPHEN 500 MG
1000 TABLET ORAL EVERY 8 HOURS
Refills: 0 | Status: DISCONTINUED | OUTPATIENT
Start: 2024-02-22 | End: 2024-02-22

## 2024-02-21 RX ORDER — CEFAZOLIN SODIUM 1 G
2000 VIAL (EA) INJECTION ONCE
Refills: 0 | Status: COMPLETED | OUTPATIENT
Start: 2024-02-21 | End: 2024-02-21

## 2024-02-21 RX ORDER — OXYCODONE HYDROCHLORIDE 5 MG/1
5 TABLET ORAL ONCE
Refills: 0 | Status: DISCONTINUED | OUTPATIENT
Start: 2024-02-21 | End: 2024-02-21

## 2024-02-21 RX ORDER — MAGNESIUM HYDROXIDE 400 MG/1
30 TABLET, CHEWABLE ORAL DAILY
Refills: 0 | Status: DISCONTINUED | OUTPATIENT
Start: 2024-02-21 | End: 2024-02-22

## 2024-02-21 RX ORDER — DICLOFENAC SODIUM 30 MG/G
4 GEL TOPICAL
Refills: 0 | DISCHARGE

## 2024-02-21 RX ORDER — ATORVASTATIN CALCIUM 80 MG/1
40 TABLET, FILM COATED ORAL DAILY
Refills: 0 | Status: DISCONTINUED | OUTPATIENT
Start: 2024-02-22 | End: 2024-02-22

## 2024-02-21 RX ORDER — POLYETHYLENE GLYCOL 3350 17 G/17G
17 POWDER, FOR SOLUTION ORAL
Qty: 0 | Refills: 0 | DISCHARGE
Start: 2024-02-21

## 2024-02-21 RX ORDER — LANOLIN ALCOHOL/MO/W.PET/CERES
5 CREAM (GRAM) TOPICAL AT BEDTIME
Refills: 0 | Status: DISCONTINUED | OUTPATIENT
Start: 2024-02-21 | End: 2024-02-22

## 2024-02-21 RX ORDER — ONDANSETRON 8 MG/1
4 TABLET, FILM COATED ORAL EVERY 6 HOURS
Refills: 0 | Status: DISCONTINUED | OUTPATIENT
Start: 2024-02-21 | End: 2024-02-22

## 2024-02-21 RX ORDER — CHOLECALCIFEROL (VITAMIN D3) 125 MCG
5000 CAPSULE ORAL DAILY
Refills: 0 | Status: DISCONTINUED | OUTPATIENT
Start: 2024-02-21 | End: 2024-02-22

## 2024-02-21 RX ORDER — ACETAMINOPHEN 500 MG
1000 TABLET ORAL ONCE
Refills: 0 | Status: DISCONTINUED | OUTPATIENT
Start: 2024-02-21 | End: 2024-02-21

## 2024-02-21 RX ORDER — OMEPRAZOLE 10 MG/1
1 CAPSULE, DELAYED RELEASE ORAL
Qty: 30 | Refills: 0
Start: 2024-02-21 | End: 2024-03-21

## 2024-02-21 RX ORDER — HYDROMORPHONE HYDROCHLORIDE 2 MG/ML
0.5 INJECTION INTRAMUSCULAR; INTRAVENOUS; SUBCUTANEOUS
Refills: 0 | Status: DISCONTINUED | OUTPATIENT
Start: 2024-02-21 | End: 2024-02-22

## 2024-02-21 RX ORDER — ROSUVASTATIN CALCIUM 5 MG/1
1 TABLET ORAL
Refills: 0 | DISCHARGE

## 2024-02-21 RX ORDER — ACETAMINOPHEN 500 MG
2 TABLET ORAL
Qty: 0 | Refills: 0 | DISCHARGE
Start: 2024-02-21

## 2024-02-21 RX ORDER — OXYCODONE HYDROCHLORIDE 5 MG/1
10 TABLET ORAL
Refills: 0 | Status: DISCONTINUED | OUTPATIENT
Start: 2024-02-21 | End: 2024-02-22

## 2024-02-21 RX ORDER — DEXAMETHASONE 0.5 MG/5ML
8 ELIXIR ORAL ONCE
Refills: 0 | Status: COMPLETED | OUTPATIENT
Start: 2024-02-22 | End: 2024-02-22

## 2024-02-21 RX ORDER — ONDANSETRON 8 MG/1
4 TABLET, FILM COATED ORAL ONCE
Refills: 0 | Status: DISCONTINUED | OUTPATIENT
Start: 2024-02-21 | End: 2024-02-21

## 2024-02-21 RX ORDER — ASPIRIN/CALCIUM CARB/MAGNESIUM 324 MG
1 TABLET ORAL
Qty: 56 | Refills: 0
Start: 2024-02-21 | End: 2024-03-19

## 2024-02-21 RX ADMIN — SODIUM CHLORIDE 125 MILLILITER(S): 9 INJECTION, SOLUTION INTRAVENOUS at 16:57

## 2024-02-21 RX ADMIN — SODIUM CHLORIDE 125 MILLILITER(S): 9 INJECTION, SOLUTION INTRAVENOUS at 18:35

## 2024-02-21 RX ADMIN — CELECOXIB 200 MILLIGRAM(S): 200 CAPSULE ORAL at 22:34

## 2024-02-21 RX ADMIN — Medication 400 MILLIGRAM(S): at 20:30

## 2024-02-21 RX ADMIN — Medication 100 MILLIGRAM(S): at 19:58

## 2024-02-21 RX ADMIN — Medication 1000 MILLIGRAM(S): at 21:00

## 2024-02-21 RX ADMIN — SODIUM CHLORIDE 500 MILLILITER(S): 9 INJECTION INTRAMUSCULAR; INTRAVENOUS; SUBCUTANEOUS at 18:35

## 2024-02-21 RX ADMIN — POLYETHYLENE GLYCOL 3350 17 GRAM(S): 17 POWDER, FOR SOLUTION ORAL at 22:34

## 2024-02-21 RX ADMIN — SODIUM CHLORIDE 500 MILLILITER(S): 9 INJECTION INTRAMUSCULAR; INTRAVENOUS; SUBCUTANEOUS at 15:06

## 2024-02-21 RX ADMIN — CELECOXIB 200 MILLIGRAM(S): 200 CAPSULE ORAL at 23:04

## 2024-02-21 RX ADMIN — SENNA PLUS 2 TABLET(S): 8.6 TABLET ORAL at 22:34

## 2024-02-21 NOTE — BRIEF OPERATIVE NOTE - TYPE OF ANESTHESIA
[address]                               PULMONARY FUNCTION    PATIENT NAME: Toi Jeong                    :        1984  MED REC NO:   [id number]                            ROOM:  ACCOUNT NO:   [de-identified]                           ADMIT DATE: 2021  PROVIDER:     Francy Garcia MD    DATE OF PROCEDURE:  2021    ATTENDING:  Vineet Mariscal MD    PULMONOLOGIST:  Francy Garcia MD    In conclusion, this PFT did not reveal evidence of an obstruction. MVV  is normal.  No evidence of restriction. No evidence of hyperinflation. The airway resistance is normal.  Diffusion studies are normal.  Flow  volume loop is normal.  With clinical presentation and in this age  bracket, asthma should be considered in differential diagnosis. If this  has not been proven, then a methacholine challenge test may be useful.         Casey Munoz MD    D: 2021 15:54:13       T: 2021 19:06:17     [id number]  Job#: [id number]     Doc#: [id number]    CC:  Vineet Mariscal [facility]

## 2024-02-21 NOTE — PHYSICAL THERAPY INITIAL EVALUATION ADULT - MANUAL MUSCLE TESTING RESULTS, REHAB EVAL
generalized weakness secondary to suregery L LE 3/5; B UE R LE grossly >=3/5/grossly assessed due to

## 2024-02-21 NOTE — DISCHARGE NOTE PROVIDER - NSDCMRMEDTOKEN_GEN_ALL_CORE_FT
amLODIPine 10 mg oral tablet: 1 tab(s) orally once a day  Crestor 10 mg oral tablet: 1 tab(s) orally once a day  Vitamin D3 5000units oral once daily:    acetaminophen 500 mg oral tablet: 2 tab(s) orally every 8 hours  amLODIPine 10 mg oral tablet: 1 tab(s) orally once a day  aspirin 81 mg oral delayed release tablet: 1 tab(s) orally every 12 hours take 2 hrs before celebrex  celecoxib 200 mg oral capsule: 1 cap(s) orally every 12 hours take 2 hrs after ecotrin  cholecalciferol oral tablet: 5000 unit(s) orally once a day  Crestor 10 mg oral tablet: 1 tab(s) orally once a day  omeprazole 20 mg oral delayed release capsule: 1 cap(s) orally once a day before breakfast  oxyCODONE 5 mg oral tablet: 1 tab(s) orally every 4 hours as needed for Moderate Pain (4 - 6) or 2 tabs every 4 hrs as needed for severe pain  [facility] [id number] MDD: 6  polyethylene glycol 3350 oral powder for reconstitution: 17 gram(s) orally once a day (at bedtime)  senna leaf extract oral tablet: 2 tab(s) orally once a day (at bedtime)

## 2024-02-21 NOTE — DISCHARGE NOTE PROVIDER - NSDCCPTREATMENT_GEN_ALL_CORE_FT
PRINCIPAL PROCEDURE  Procedure: Left total knee arthroplasty  Findings and Treatment: osteoarrthritis left knee

## 2024-02-21 NOTE — DISCHARGE NOTE PROVIDER - NSDCFUSCHEDAPPT_GEN_ALL_CORE_FT
Maribel Payne  [facility]  ORTHOSURG [address] [place] [address]  Scheduled Appointment: 03/07/2024    Korin Peralta  [facility]  ORTHOSURG [address] [place] [address]  Scheduled Appointment: 04/16/2024

## 2024-02-21 NOTE — DISCHARGE NOTE PROVIDER - HOSPITAL COURSE
The patient is a 63  y.o. male with severe osteoarthritis of the left knee.  He was evaluated by the PST dept at [facility] and obtained the appropriate medical clearances.  After admission on 2/21/24 and receiving pre-operative parenteral prophylactic antibiotics (ancef), the patient  underwent an  uncomplicated left TKA by orthopedic surgeon Dr. Korin Peralta.    A medical consultation from the Hospitalist service was obtained for post-operative medical co-management. Typical Physical & occupational therapy modalities post TKA were performed including ambulation training, range of motion, ADL's, and transfers. ecotrin 81 mg every 12 hrs, along w/ bilat venodynes, was given for DVT prophylaxis (caprini 8).  The patient had a clean appearing surgical incision with no sign of surgical site infections and had a stable neuro / vascular exam of the operated extremity.  After progression of mobility guided by the PT/ OT staff,  the patient was felt to benefit from further rehabilitative care for restoration to level of function. This was felt to best be accomplished at  home w/ home care (skilled RN/PT/OT).  Discharge and Orthopedic Care instructions were delineated in the Discharge Plan and reviewed with the patient. All medications were delineated in the medication reconciliation tool and key points were reviewed with the patient. They were deemed stable from an Orthopedic & medical standpoint for discharge today.  Upon completion of Home care he will be  following up with Dr. Peralta for office  follow up Orthopedic care.

## 2024-02-21 NOTE — BRIEF OPERATIVE NOTE - COMMENTS
implants: empowr: tibia #8 left w/ 14 mm VVC vit E liner, femur #7 left PS, patella 35 mm vit E button

## 2024-02-21 NOTE — DISCHARGE NOTE PROVIDER - INSTRUCTIONS
For Constipation :   • Increase your water intake. Drink at least 8 glasses of water daily.  • Try adding fiber to your diet by eating fruits, vegetables and foods that are rich in grains.  • If you do experience constipation, you may take an over-the-counter stool softener/laxative such as Shanice Colace, Senekot, miralax or  Milk of Magnesia.

## 2024-02-21 NOTE — DISCHARGE NOTE PROVIDER - CARE PROVIDER_API CALL
Korin Peralta  Orthopaedic Surgery  [address], [address]  [place] [address]  Phone: [phone]  Fax: [phone]  Established Patient  Scheduled Appointment: 03/07/2024 09:30 AM

## 2024-02-21 NOTE — DISCHARGE NOTE PROVIDER - PROVIDER TOKENS
PROVIDER:[TOKEN:[[id number]],SCHEDULEDAPPT:[03/07/2024],SCHEDULEDAPPTTIME:[09:30 AM],ESTABLISHEDPATIENT:[T]]

## 2024-02-21 NOTE — CONSULT NOTE ADULT - ASSESSMENT
63M s/p TKR    OA Left knee s/p TKR  Pain Management: acceptable- continue current care Tylenol ATC/Celebrex ATC/ Oxycodone PRN  Continue PT/OT  DVT proph: [x  ] low risk - Aspirin  [  ] high risk -Lovenox [  ] high risk - Eliquis [  ] other:_________  DC plan:  [x  ] Home with HC  [  ] Rehab   [  ] TBD  [  ]other:___________    HLD  continue statin, takes in AM    HTN  continue norvasc

## 2024-02-21 NOTE — CONSULT NOTE ADULT - SUBJECTIVE AND OBJECTIVE BOX
Patient is a 63y old  Male who presents with a chief complaint of Left Knee Pain (21 Feb 2024 15:27)      HPI:  63M presented with approximately 2 year history of left knee pain. He has had Cortisone and gel injections and PT with no improvement. He rates the pain at 3/10. He is not taking anything for pain. He states that he hasn't been able to walk normally.   Now s/p left knee replacement.   No complaints at this time, reports still a little numb in the knee, no pain.   Did get straight cath in PACU for 750cc.      REVIEW OF SYSTEMS:  CONSTITUTIONAL: No fever, weight loss, or fatigue  EYES: No eye pain, visual disturbances, or discharge  ENMT:  No difficulty hearing, tinnitus, vertigo; No sinus or throat pain  NECK: No pain or stiffness  BREASTS: No pain, masses, or nipple discharge  RESPIRATORY: No cough, wheezing, chills or hemoptysis; No shortness of breath  CARDIOVASCULAR: No chest pain, palpitations, dizziness, or leg swelling  GASTROINTESTINAL: No abdominal or epigastric pain. No nausea, vomiting, or hematemesis; No diarrhea or constipation. No melena or hematochezia.  GENITOURINARY: No dysuria, frequency, hematuria, or incontinence  NEUROLOGICAL: No headaches, memory loss, loss of strength, numbness, or tremors  SKIN: No itching, burning, rashes, or lesions   LYMPH NODES: No enlarged glands  ENDOCRINE: No heat or cold intolerance; No hair loss  MUSCULOSKELETAL: No muscle or back pain  PSYCHIATRIC: No depression, anxiety, mood swings, or difficulty sleeping  HEME/LYMPH: No easy bruising, or bleeding gums  ALLERGY AND IMMUNOLOGIC: No hives or eczema    PAST MEDICAL & SURGICAL HISTORY:  Osteoarthritis    Hypertension    Hyperlipidemia    H/O Bell's palsy    Tooth loose    Smoking    S/P hemorrhoidectomy    S/P colonoscopy with polypectomy          SOCIAL HISTORY:  Residence: [ ] [facility]  [ ] [facility]  [x ] Community  [ ] Substance abuse: denies  [ ] Tobacco: 1ppd  [ ] Alcohol use: liquor 1 per month or so    Allergies  Allergy Status Unknown      MEDICATIONS  (STANDING):  acetaminophen   IVPB .. 1000 milliGRAM(s) IV Intermittent once  acetaminophen   IVPB .. 1000 milliGRAM(s) IV Intermittent every 6 hours  aprepitant 40 milliGRAM(s) Oral once  atorvastatin 40 milliGRAM(s) Oral daily  ceFAZolin   IVPB 2000 milliGRAM(s) IV Intermittent every 8 hours  celecoxib 200 milliGRAM(s) Oral every 12 hours  chlorhexidine 2% Cloths 1 Application(s) Topical once  cholecalciferol 5000 Unit(s) Oral daily  lactated ringers. 1000 milliLiter(s) (125 mL/Hr) IV Continuous <Continuous>  pantoprazole    Tablet 40 milliGRAM(s) Oral before breakfast  polyethylene glycol 3350 17 Gram(s) Oral at bedtime  senna 2 Tablet(s) Oral at bedtime  sodium chloride 0.9% Bolus 500 milliLiter(s) IV Bolus once    MEDICATIONS  (PRN):  aluminum hydroxide/magnesium hydroxide/simethicone Suspension 30 milliLiter(s) Oral four times a day PRN Indigestion  HYDROmorphone  Injectable 0.5 milliGRAM(s) IV Push every 3 hours PRN Breakthrough pain  magnesium hydroxide Suspension 30 milliLiter(s) Oral daily PRN Constipation  ondansetron Injectable 4 milliGRAM(s) IV Push every 6 hours PRN Nausea and/or Vomiting  oxyCODONE    IR 10 milliGRAM(s) Oral every 3 hours PRN Severe Pain (7 - 10)  oxyCODONE    IR 5 milliGRAM(s) Oral every 3 hours PRN Moderate Pain (4 - 6)      FAMILY HISTORY:  Family history of stomach cancer (Mother)    Family history of diabetes mellitus in son (Child)        Vital Signs Last 24 Hrs  T(C): 36.7 (21 Feb 2024 15:50), Max: 36.7 (21 Feb 2024 15:50)  T(F): 98 (21 Feb 2024 15:50), Max: 98 (21 Feb 2024 15:50)  HR: 74 (21 Feb 2024 15:50) (67 - 80)  BP: 118/69 (21 Feb 2024 15:50) (101/62 - 139/80)  BP(mean): --  RR: 14 (21 Feb 2024 15:50) (13 - 17)  SpO2: 98% (21 Feb 2024 15:50) (94% - 100%)    Parameters below as of 21 Feb 2024 15:50  Patient On (Oxygen Delivery Method): room air        PHYSICAL EXAM:    GENERAL: NAD, well-groomed, well-developed  HEAD:  Atraumatic, Normocephalic  EYES:  conjunctiva and sclera clear  ENMT: Moist mucous membranes,  NECK: Supple, No JVD  NERVOUS SYSTEM:  Alert & Oriented X3, Good concentration; Moving all 4 extremities; No gross sensory deficits  CHEST/LUNG: Clear to auscultation bilaterally;   HEART: Regular rate and rhythm;   ABDOMEN: Soft, Nontender, Nondistended; Bowel sounds present  EXTREMITIES:  2+ Peripheral Pulses, No clubbing, cyanosis, or edema  LYMPH: No lymphadenopathy noted  /RECTAL: Not examined  BREAST: Not examined  SKIN: No rashes or lesions  INCISION:  dressing dry and intact    LABS:          PTT - ( 21 Feb 2024 09:04 )  PTT:33.5 sec    CAPILLARY BLOOD GLUCOSE          RADIOLOGY & ADDITIONAL STUDIES:    EKG: NSR  Personally Reviewed:  [ x] YES     Imaging: TKR in place  Personally Reviewed:  [ x] YES     Consultant(s) Notes Reviewed:   preop med eval reviewed    Care Discussed with Consultants/Other Providers: ortho PA at bedside

## 2024-02-21 NOTE — DISCHARGE NOTE PROVIDER - NSDCCPCAREPLAN_GEN_ALL_CORE_FT
PRINCIPAL DISCHARGE DIAGNOSIS  Diagnosis: Primary osteoarthritis of left knee  Assessment and Plan of Treatment: left TKA  Physical Therapy/Occupational Therapy for: ambulation, transfers, stairs, ADL's (activities of daily living), range of motion exercises, and isometrics  -Activity  • Weight Bearing as tolerated with rolling walker.  • Take short, frequent walks increasing the distance that you walk each day as tolerated.  • Change your position every hour to decrease pain and stiffness.  • Continue the exercises taught to you by your physical therapist.  • No driving until cleared by the doctor.  • No tub baths, hot tubs, or swimming pools until instructed by your doctor.  • Do not squat down on the floor.  • Do not kneel or twist your knee.  • Range of Motion Goals: Flexion= 120 degrees, Extension = 0 degrees  Keep incision clean and dry. You have a mepilex dressing. It is water resistant and may get slightly wet in the shower.  Remove dressing in 7 days and leave wound open to air.  Wound may get wet in the shower as long as there is no drainage from wound. Prineo was used for skin closure.  It is water resistant and may get slightly wet in the shower, but don't soak it as it may loosen.  Leave open to air.  Prineo removal 2 weeks after surgery at Surgeon's office.  Use cryocuff for 20 minute sessions w/ at least 1 hr between sessions.  Use a towel or washcloth under cuff; don't place directly on the skin.  Opioid safety :  Do not keep opioid in the medicine cabinet in bathroom or on kitchen counter where others may have access to it.  Do not drive while taking opioid.  To dispose of it some pharmacies do have drop boxes as do police stations.  Do not flush or put in trash.

## 2024-02-21 NOTE — PHYSICAL THERAPY INITIAL EVALUATION ADULT - ADDITIONAL COMMENTS
Pt resides in pvt home with spouse, available to assist as needed. Pt states 10STE +HR, 7+7+7 +HR. Pt has RW and commode. Pt has tub shower. Pt reports was independent prior to admission.

## 2024-02-21 NOTE — DISCHARGE NOTE PROVIDER - NSDCHHPTASSISTHOME_GEN_ALL_CORE
A- Acute respiratory respiratory failure/pneumonia/ DM/a fib/ATN    Suggest:  try to decrease sedation  taper pressors  no vent changes  seen fluids balance, lasix  continue ABS  check urine studie, ua  hold vanv pending levels A- Acute respiratory respiratory failure/pneumonia/ DM/a fib/ATN    Suggest:  try to decrease sedation  taper pressors  no vent changes  seen fluids balance, lasix  continue ABS  check urine studies, ua  check vanc level despite being off to see if any present Patient Needs Assistance to Leave Residence...

## 2024-02-22 ENCOUNTER — TRANSCRIPTION ENCOUNTER (OUTPATIENT)
Age: 64
End: 2024-02-22

## 2024-02-22 VITALS
RESPIRATION RATE: 16 BRPM | HEART RATE: 74 BPM | SYSTOLIC BLOOD PRESSURE: 101 MMHG | OXYGEN SATURATION: 99 % | TEMPERATURE: 98 F | DIASTOLIC BLOOD PRESSURE: 72 MMHG

## 2024-02-22 LAB
ANION GAP SERPL CALC-SCNC: 12 MMOL/L — SIGNIFICANT CHANGE UP (ref 5–17)
BUN SERPL-MCNC: 19 MG/DL — SIGNIFICANT CHANGE UP (ref 7–23)
CALCIUM SERPL-MCNC: 8.3 MG/DL — LOW (ref 8.4–10.5)
CHLORIDE SERPL-SCNC: 103 MMOL/L — SIGNIFICANT CHANGE UP (ref 96–108)
CO2 SERPL-SCNC: 22 MMOL/L — SIGNIFICANT CHANGE UP (ref 22–31)
CREAT SERPL-MCNC: 0.92 MG/DL — SIGNIFICANT CHANGE UP (ref 0.5–1.3)
EGFR: 92 ML/MIN/1.73M2 — SIGNIFICANT CHANGE UP
GLUCOSE SERPL-MCNC: 151 MG/DL — HIGH (ref 70–99)
HCT VFR BLD CALC: 32.3 % — LOW (ref 39–50)
HGB BLD-MCNC: 10.9 G/DL — LOW (ref 13–17)
MCHC RBC-ENTMCNC: 31.1 PG — SIGNIFICANT CHANGE UP (ref 27–34)
MCHC RBC-ENTMCNC: 33.7 GM/DL — SIGNIFICANT CHANGE UP (ref 32–36)
MCV RBC AUTO: 92 FL — SIGNIFICANT CHANGE UP (ref 80–100)
NRBC # BLD: 0 /100 WBCS — SIGNIFICANT CHANGE UP (ref 0–0)
PLATELET # BLD AUTO: 238 K/UL — SIGNIFICANT CHANGE UP (ref 150–400)
POTASSIUM SERPL-MCNC: 3.8 MMOL/L — SIGNIFICANT CHANGE UP (ref 3.5–5.3)
POTASSIUM SERPL-SCNC: 3.8 MMOL/L — SIGNIFICANT CHANGE UP (ref 3.5–5.3)
RBC # BLD: 3.51 M/UL — LOW (ref 4.2–5.8)
RBC # FLD: 14.8 % — HIGH (ref 10.3–14.5)
SODIUM SERPL-SCNC: 137 MMOL/L — SIGNIFICANT CHANGE UP (ref 135–145)
WBC # BLD: 15.53 K/UL — HIGH (ref 3.8–10.5)
WBC # FLD AUTO: 15.53 K/UL — HIGH (ref 3.8–10.5)

## 2024-02-22 PROCEDURE — 36415 COLL VENOUS BLD VENIPUNCTURE: CPT

## 2024-02-22 PROCEDURE — C1776: CPT

## 2024-02-22 PROCEDURE — 97110 THERAPEUTIC EXERCISES: CPT

## 2024-02-22 PROCEDURE — 80048 BASIC METABOLIC PNL TOTAL CA: CPT

## 2024-02-22 PROCEDURE — 97535 SELF CARE MNGMENT TRAINING: CPT

## 2024-02-22 PROCEDURE — 99232 SBSQ HOSP IP/OBS MODERATE 35: CPT

## 2024-02-22 PROCEDURE — 97161 PT EVAL LOW COMPLEX 20 MIN: CPT

## 2024-02-22 PROCEDURE — 94664 DEMO&/EVAL PT USE INHALER: CPT

## 2024-02-22 PROCEDURE — 97116 GAIT TRAINING THERAPY: CPT

## 2024-02-22 PROCEDURE — 97530 THERAPEUTIC ACTIVITIES: CPT

## 2024-02-22 PROCEDURE — 85027 COMPLETE CBC AUTOMATED: CPT

## 2024-02-22 PROCEDURE — C1889: CPT

## 2024-02-22 PROCEDURE — 85730 THROMBOPLASTIN TIME PARTIAL: CPT

## 2024-02-22 PROCEDURE — 97165 OT EVAL LOW COMPLEX 30 MIN: CPT

## 2024-02-22 PROCEDURE — 73560 X-RAY EXAM OF KNEE 1 OR 2: CPT

## 2024-02-22 RX ORDER — CHOLECALCIFEROL (VITAMIN D3) 125 MCG
5000 CAPSULE ORAL
Qty: 0 | Refills: 0 | DISCHARGE
Start: 2024-02-22

## 2024-02-22 RX ORDER — OXYCODONE HYDROCHLORIDE 5 MG/1
1 TABLET ORAL
Qty: 42 | Refills: 0
Start: 2024-02-22

## 2024-02-22 RX ORDER — CELECOXIB 200 MG/1
1 CAPSULE ORAL
Qty: 60 | Refills: 0
Start: 2024-02-22 | End: 2024-03-22

## 2024-02-22 RX ADMIN — Medication 1000 MILLIGRAM(S): at 02:44

## 2024-02-22 RX ADMIN — Medication 1000 MILLIGRAM(S): at 09:58

## 2024-02-22 RX ADMIN — OXYCODONE HYDROCHLORIDE 5 MILLIGRAM(S): 5 TABLET ORAL at 14:15

## 2024-02-22 RX ADMIN — Medication 81 MILLIGRAM(S): at 06:28

## 2024-02-22 RX ADMIN — ATORVASTATIN CALCIUM 40 MILLIGRAM(S): 80 TABLET, FILM COATED ORAL at 13:26

## 2024-02-22 RX ADMIN — Medication 400 MILLIGRAM(S): at 02:14

## 2024-02-22 RX ADMIN — Medication 101.6 MILLIGRAM(S): at 06:28

## 2024-02-22 RX ADMIN — Medication 5000 UNIT(S): at 13:26

## 2024-02-22 RX ADMIN — Medication 1000 MILLIGRAM(S): at 09:28

## 2024-02-22 RX ADMIN — CELECOXIB 200 MILLIGRAM(S): 200 CAPSULE ORAL at 13:26

## 2024-02-22 RX ADMIN — Medication 100 MILLIGRAM(S): at 04:10

## 2024-02-22 RX ADMIN — OXYCODONE HYDROCHLORIDE 5 MILLIGRAM(S): 5 TABLET ORAL at 13:45

## 2024-02-22 RX ADMIN — Medication 5 MILLIGRAM(S): at 00:32

## 2024-02-22 RX ADMIN — PANTOPRAZOLE SODIUM 40 MILLIGRAM(S): 20 TABLET, DELAYED RELEASE ORAL at 06:28

## 2024-02-22 RX ADMIN — CELECOXIB 200 MILLIGRAM(S): 200 CAPSULE ORAL at 13:56

## 2024-02-22 NOTE — DISCHARGE NOTE NURSING/CASE MANAGEMENT/SOCIAL WORK - NSSCNAMETXT_GEN_ALL_CORE
[facility] care agency [phone] will reach out to you within 24-72 hours of your discharge to schedule home care visit/eval appointment with you. Please call agency for any queries regarding home care services

## 2024-02-22 NOTE — CARE COORDINATION ASSESSMENT. - NSPASTMEDSURGHISTORY_GEN_ALL_CORE_FT
PAST MEDICAL & SURGICAL HISTORY:  Smoking      Tooth loose      H/O Bell's palsy      Hyperlipidemia      Hypertension      Osteoarthritis      S/P colonoscopy with polypectomy      S/P hemorrhoidectomy

## 2024-02-22 NOTE — PHARMACOTHERAPY INTERVENTION NOTE - COMMENTS
Admission medication reconciliation POD1  
Transition of Care video discharge education - medication calendar given to patient  Jemima, PharmD Candidate for Marlen Garcia, PharmD

## 2024-02-22 NOTE — PROGRESS NOTE ADULT - ASSESSMENT
63M s/p TKR    OA Left knee s/p TKR  Pain Management: acceptable- continue current care Tylenol ATC/Celebrex ATC/ Oxycodone PRN  Continue PT/OT  DVT proph: [x  ] low risk - Aspirin  [  ] high risk -Lovenox [  ] high risk - Eliquis [  ] other:_________  DC plan:  [x  ] Home with HC  [  ] Rehab   [  ] TBD  [  ]other:___________  Reminded patient: No driving probably at least 4 weeks or otherwise cleared by surgery, no swimming/bathing, use walker at all times until otherwise cleared by PT, speak to Dr Peralta about antibiotics prior to planned dental work, ensure dogs do not have access to incision until fully healed.     HLD  continue statin, takes in AM    HTN  continue norvasc 63M s/p TKR    OA Left knee s/p TKR  Pain Management: acceptable- continue current care Tylenol ATC/Celebrex ATC/ Oxycodone PRN  Continue PT/OT  DVT proph: [x  ] low risk - Aspirin  [  ] high risk -Lovenox [  ] high risk - Eliquis [  ] other:_________  DC plan:  [x  ] Home with HC  [  ] Rehab   [  ] TBD  [  ]other:___________  Reminded patient: No driving probably at least 4 weeks or otherwise cleared by surgery, no swimming/bathing, use walker at all times until otherwise cleared by PT, speak to Dr Peralta about antibiotics prior to planned dental work, ensure dogs do not have access to incision until fully healed.     HLD  continue statin, takes in AM    HTN  continue norvasc    Wife at bedside, all questions answered.

## 2024-02-22 NOTE — CAREGIVER ENGAGEMENT NOTE - CAREGIVER OUTREACH NOTES - FREE TEXT
TRANSITION OF CARE PLAN  Patient is self-directing own DC planning;   DC to home; spouse Mishel and grown son at home will assume care; home care with [facility] per pt. choice; to f/u with JOSIAS GOEL MD post DC;  will arrange own transport at DC. NO DME NEEDS;
TRANSITION OF CARE PLAN  Patient is self-directing own DC planning;   DC to home; spouse Mishel and grown son at home will assume care; home care with [facility] per pt. choice; to f/u with JOSIAS GOEL MD post DC;  will arrange own transport at DC. NO DME NEEDS;

## 2024-02-22 NOTE — DISCHARGE NOTE NURSING/CASE MANAGEMENT/SOCIAL WORK - PATIENT PORTAL LINK FT
You can access the FollowMyHealth Patient Portal offered by [facility] by registering at the following website: [email or web address]. By joining [employer]’s FollowMyHealth portal, you will also be able to view your health information using other applications (apps) compatible with our system.

## 2024-02-22 NOTE — CARE COORDINATION ASSESSMENT. - NSADDITIONAL INFORMATION_FT
RN MIGUELITO met with pt.  and spouse Mishel for assessment; transition of care planning at bedside, lives with family in private home;4STE to enter; [address] to [address] [place] apt;  A&O x4;  CM role and Dc planning process explained; verbalized understanding.  Pt. reports; ambulates on own power; independent in stairs, ADLs/ iADLs prior to s/p;  Patient has RW; Commode in the home;  Patient expressed he does NOT want to go to Rehab, he has family and friends that can assist him at home,   TRANSITION OF CARE PLAN  Patient is self-directing own DC planning;   DC to home; spouse Mishel and grown son at home will assume care; home care with [facility] per pt. choice; to f/u with JOSIAS GOEL MD post DC;  will arrange own transport at DC. NO DME NEEDS;

## 2024-02-22 NOTE — CARE COORDINATION ASSESSMENT. - NSCAREPROVIDERS_GEN_ALL_CORE_FT
CARE PROVIDERS:  Administration: Bernice Melendrez  Administration: Richa Hopkins  Administration: Jennie Prince  Admitting: Korin Peralta  Attending: Korin Peralta  Case Management: Santaromana, Anna  Consultant: Stone Stevens  Covering Team: Dmitriy St  Infection Control: Desiree Montague  Nurse: Helen Lopez  Nurse: Sandra Prieto  Nurse: Sarah Yates  Occupational Therapy: Helen Jefferson  Override: Olga Scott  PCA/Nursing Assistant: Sean Durand  PCA/Nursing Assistant: Snow Loera  Physical Therapy: Zo Salinas  Primary Team: Zo Mishra  Primary Team: Reggie Mercedes  Primary Team: Steven Islas  Primary Team: Vel Ruvalcaba  Primary Team: Avinash Elder  Primary Team: India Gaffney  Primary Team: Jean Carlos Chance  Primary Team: Desiree Simmons  Registered Dietitian: Dara Dempsey  Team: ANTONIETTA  Hospitalists, Team  UR// Supp. Assoc.: Samantha Ross

## 2024-02-22 NOTE — PROGRESS NOTE ADULT - SUBJECTIVE AND OBJECTIVE BOX
Orthopaedic Post Op Note    Procedure: Left TKR  Surgeon: Korin Peralta     63y Male comfortable, without complaints. Ambulated with PT. Tolerating diet. Was straight cath'd in PACU. Reported pain score = 0  Denies N/V, CP, SOB, numbness/tingling of extremities.    PE:  Vital Signs Last 24 Hrs  T(C): 36.7 (21 Feb 2024 15:50), Max: 36.7 (21 Feb 2024 15:50)  T(F): 98 (21 Feb 2024 15:50), Max: 98 (21 Feb 2024 15:50)  HR: 74 (21 Feb 2024 15:50) (67 - 80)  BP: 118/69 (21 Feb 2024 15:50) (101/62 - 139/80)  RR: 14 (21 Feb 2024 15:50) (13 - 17)  SpO2: 98% (21 Feb 2024 15:50) (94% - 100%)    Parameters below as of 21 Feb 2024 15:50  Patient On (Oxygen Delivery Method): room air      General: Pt alert and oriented   Left Knee Dressing: C/D/I, Cryo/cuff in place   Bilateral LEs:  Motor:   5/5 dorsiflexion, plantarflexion, EHL  Sensation intact to LT  2+ DP Pulses  SCDs in place      A/P: 63y Male POD#0 s/p Left TKR  - Stable  - Acetaminophen, Celebrex, Oxycodone, Dilaudid for Pain Control   - DVT ppx: Aspirin 81mg q 12 h   - Shanice op IV abx: Ancef   - PT, OT per protocol  - F/U AM Labs  DCP = home tomorrow pending PT, OT, medical clearance       
s/p Left TKR.  Has not voided and c/o bladder discomfort.  Bladder scan shows 791 ml. Will proceed with straight cath.   
Pt seen in preop holding to review meds.  On exam DP pulses 2+ bilat, motor 5/5 bilat (ankle df/pf and ehl) sensation normal bilat.
Patient is a 63y old  Male who presents with a chief complaint of tka (22 Feb 2024 11:10)      INTERVAL HPI/OVERNIGHT EVENTS:  no complaints. pain controlled.  wants to go home.     MEDICATIONS  (STANDING):  acetaminophen     Tablet .. 1000 milliGRAM(s) Oral every 8 hours  acetaminophen   IVPB .. 1000 milliGRAM(s) IV Intermittent once  aprepitant 40 milliGRAM(s) Oral once  aspirin enteric coated 81 milliGRAM(s) Oral every 12 hours  atorvastatin 40 milliGRAM(s) Oral daily  celecoxib 200 milliGRAM(s) Oral every 12 hours  chlorhexidine 2% Cloths 1 Application(s) Topical once  cholecalciferol 5000 Unit(s) Oral daily  lactated ringers. 1000 milliLiter(s) (125 mL/Hr) IV Continuous <Continuous>  melatonin 5 milliGRAM(s) Oral at bedtime  pantoprazole    Tablet 40 milliGRAM(s) Oral before breakfast  polyethylene glycol 3350 17 Gram(s) Oral at bedtime  senna 2 Tablet(s) Oral at bedtime    MEDICATIONS  (PRN):  aluminum hydroxide/magnesium hydroxide/simethicone Suspension 30 milliLiter(s) Oral four times a day PRN Indigestion  HYDROmorphone  Injectable 0.5 milliGRAM(s) IV Push every 3 hours PRN Breakthrough pain  magnesium hydroxide Suspension 30 milliLiter(s) Oral daily PRN Constipation  nicotine -  14 mG/24Hr(s) Patch 1 Patch Transdermal daily PRN nicotine withdrawal symptoms  ondansetron Injectable 4 milliGRAM(s) IV Push every 6 hours PRN Nausea and/or Vomiting  oxyCODONE    IR 10 milliGRAM(s) Oral every 3 hours PRN Severe Pain (7 - 10)  oxyCODONE    IR 5 milliGRAM(s) Oral every 3 hours PRN Moderate Pain (4 - 6)      Allergies  Allergy Status Unknown    REVIEW OF SYSTEMS:  CONSTITUTIONAL: No fever, weight loss, or fatigue  EYES: No eye pain, visual disturbances, or discharge  ENMT:  No difficulty hearing, tinnitus, vertigo; No sinus or throat pain  NECK: No pain or stiffness  BREASTS: No pain, masses, or nipple discharge  RESPIRATORY: No cough, wheezing, chills or hemoptysis; No shortness of breath  CARDIOVASCULAR: No chest pain, palpitations, or lightheadedness  GASTROINTESTINAL: No abdominal or epigastric pain. No nausea, vomiting, or hematemesis; No diarrhea or constipation. No melena or hematochezia.  GENITOURINARY: No dysuria, frequency, hematuria, or incontinence  NEUROLOGICAL: No headaches, vertigo, memory loss, loss of strength, numbness, or tremors  SKIN: No itching, burning, rashes, or lesions   LYMPH NODES: No enlarged glands  ENDOCRINE: No heat or cold intolerance; No hair loss; No polydipsia or polyuria  MUSCULOSKELETAL: No back pain  PSYCHIATRIC: No depression, anxiety, or mood swings  HEME/LYMPH: No easy bruising, or bleeding gums  ALLERGY AND IMMUNOLOGIC: No hives or eczema    Vital Signs Last 24 Hrs  T(C): 36.7 (22 Feb 2024 08:09), Max: 36.7 (21 Feb 2024 15:50)  T(F): 98 (22 Feb 2024 08:09), Max: 98 (21 Feb 2024 15:50)  HR: 74 (22 Feb 2024 08:09) (67 - 80)  BP: 101/72 (22 Feb 2024 08:09) (101/62 - 118/69)  BP(mean): --  RR: 16 (22 Feb 2024 08:09) (14 - 17)  SpO2: 99% (22 Feb 2024 08:09) (94% - 99%)    Parameters below as of 22 Feb 2024 08:09  Patient On (Oxygen Delivery Method): room air        PHYSICAL EXAM:  GENERAL: NAD, well-groomed, well-developed  HEAD:  Atraumatic, Normocephalic  EYES:conjunctiva and sclera clear  ENMT: Moist mucous membranes  NECK: Supple, No JVD  NERVOUS SYSTEM:  Alert & Oriented X3, Good concentration; Bilateral LE mobile, sensation to light touch intact  CHEST/LUNG: Clear to auscultation bilaterally;   HEART: Regular rate and rhythm;  ABDOMEN: Soft, Nontender, Nondistended; Bowel sounds present  EXTREMITIES:  2+ Peripheral Pulses, No clubbing or cyanosis  LYMPH: No lymphadenopathy noted  SKIN: No rashes or lesions  INCISION:  Dressing dry and intact    LABS:                        10.9   15.53 )-----------( 238      ( 22 Feb 2024 06:00 )             32.3     22 Feb 2024 06:00    137    |  103    |  19     ----------------------------<  151    3.8     |  22     |  0.92     Ca    8.3        22 Feb 2024 06:00      PTT - ( 21 Feb 2024 09:04 )  PTT:33.5 sec  Urinalysis Basic - ( 22 Feb 2024 06:00 )    Color: x / Appearance: x / SG: x / pH: x  Gluc: 151 mg/dL / Ketone: x  / Bili: x / Urobili: x   Blood: x / Protein: x / Nitrite: x   Leuk Esterase: x / RBC: x / WBC x   Sq Epi: x / Non Sq Epi: x / Bacteria: x      CAPILLARY BLOOD GLUCOSE          RADIOLOGY & ADDITIONAL TESTS:    Imaging Personally Reviewed:      [ ] Consultant(s) Notes Reviewed  [x] Care Discussed with Consultants/Other Providers:  Ortho PA- plan of care
Procedure: Left TKR  POD #: 1  S: Pt without complaints. No SOB,CP, N/V. Tolerated Diet well.   Pain comfortable on tylenol and celebrex.  No oxy used as yet.   No BM yet, + flatus, No abdominal pain.  +voiding (had to be straight cathed in pacu.  Pain Rx:  acetaminophen     Tablet .. 1000 milliGRAM(s) Oral every 8 hours  acetaminophen   IVPB .. 1000 milliGRAM(s) IV Intermittent once  aprepitant 40 milliGRAM(s) Oral once  celecoxib 200 milliGRAM(s) Oral every 12 hours  HYDROmorphone  Injectable 0.5 milliGRAM(s) IV Push every 3 hours PRN  melatonin 5 milliGRAM(s) Oral at bedtime  ondansetron Injectable 4 milliGRAM(s) IV Push every 6 hours PRN  oxyCODONE    IR 5 milliGRAM(s) Oral every 3 hours PRN  oxyCODONE    IR 10 milliGRAM(s) Oral every 3 hours PRN    O: General: On exam, No Apparent Distress  Vital Signs Last 24 Hrs  T(C): 36.7 (22 Feb 2024 08:09), Max: 36.7 (21 Feb 2024 15:50)  T(F): 98 (22 Feb 2024 08:09), Max: 98 (21 Feb 2024 15:50)  HR: 74 (22 Feb 2024 08:09) (67 - 80)  BP: 101/72 (22 Feb 2024 08:09) (101/62 - 139/80)  BP(mean): --  RR: 16 (22 Feb 2024 08:09) (13 - 17)  SpO2: 99% (22 Feb 2024 08:09) (94% - 100%)    Parameters below as of 22 Feb 2024 08:09  Patient On (Oxygen Delivery Method): room air        Lungs: BS clear bilat.  Heart: RRR no murmur  Abdomen: + BS, soft , benign exam     Ext --left knee ace removed.  mepilex clean and dry  ROM: 0-75  Neurologic:  Has sensation over feet & toes bilat. Full AROM bilat feet & toes. EHL/AT = 5/5  Vascular: Feet toes warm, pink. DP = 2+. No calf tenderness bilat..  VTEP: On Bilat. Venodynes + aspirin enteric coated 81 milliGRAM(s) Oral every 12 hours    I&O's Detail    21 Feb 2024 07:01  -  22 Feb 2024 07:00  --------------------------------------------------------  IN:    Lactated Ringers: 1800 mL  Total IN: 1800 mL    OUT:    Intermittent Catheterization - Urethral (mL): 790 mL  Total OUT: 790 mL    Total NET: 1010 mL          Activity in PT yesterday: Walked 150'  Labs yesterday noted.  Hospitalist input noted.  Labs Today:                         10.9   15.53 )-----------( 238      ( 22 Feb 2024 06:00 )             32.3       02-22    137  |  103  |  19  ----------------------------<  151<H>  3.8   |  22  |  0.92    Ca    8.3<L>      22 Feb 2024 06:00        Primary Orthopedic Assessment:  • Stable from Orthopedic perspective  • Neuro motor exam stable  • Labs: CBC / Chem stable      Plan:   • Continue:  PT/OT/WBAT with assistance of a walker/Ice to knee/ Knee ROM         Incentive spirometry encouraged   • Continue DVT prophylaxis as prescribed, including use of compression devices and ankle pumps  • Continue Pain Rx  • Plans per Medicine   • Discharge planning – anticipated discharge is Home D/C with home care & home PT  when medically stable & cleared by PT/OT--today  Cryocuff teaching done.  Opioid safety discussed w/ pt.  Do not keep opioid in the medicine cabinet in bathroom or on kitchen counter where others may have access to it.  Do not drive while taking opioid.  To dispose of it some pharmacies do have drop boxes as do police stations.  Do not flush or put in trash.

## 2024-03-07 ENCOUNTER — APPOINTMENT (OUTPATIENT)
Dept: ORTHOPEDIC SURGERY | Facility: CLINIC | Age: 64
End: 2024-03-07
Payer: MEDICARE

## 2024-03-07 VITALS
HEART RATE: 102 BPM | TEMPERATURE: 98.3 F | DIASTOLIC BLOOD PRESSURE: 72 MMHG | HEIGHT: 67 IN | SYSTOLIC BLOOD PRESSURE: 131 MMHG | WEIGHT: 167 LBS | BODY MASS INDEX: 26.21 KG/M2

## 2024-03-07 PROCEDURE — 99024 POSTOP FOLLOW-UP VISIT: CPT

## 2024-03-07 PROCEDURE — 73562 X-RAY EXAM OF KNEE 3: CPT | Mod: LT

## 2024-03-07 RX ORDER — AMOXICILLIN 500 MG/1
500 CAPSULE ORAL
Qty: 12 | Refills: 0 | Status: ACTIVE | COMMUNITY
Start: 2024-03-07 | End: 1900-01-01

## 2024-03-07 NOTE — HISTORY OF PRESENT ILLNESS
[___ Weeks Post Op] : [unfilled] weeks post op [7] : the patient reports pain that is 7/10 in severity [Chills] : no chills [Constipation] : no constipation [Diarrhea] : no diarrhea [Dysuria] : no dysuria [Nausea] : no nausea [Fever] : no fever [Clean/Dry/Intact] : clean, dry and intact [Vomiting] : no vomiting [Erythema] : erythematous [Discharge] : absent of discharge [Swelling] : swollen [Dehiscence] : not dehisced [Vascular Intact] : ~T peripheral vascular exam normal [Neuro Intact] : an unremarkable neurological exam [Negative Nila's] : maneuvers demonstrated a negative Nila's sign [Xray (Date:___)] : [unfilled] Xray -  [Hardware in Good Position] : hardware in good position [No Obvious Fractures] : no obvious fractures [Good Overall Alignment] : good overall alignment [Doing Well] : is doing well [Adequate Pain Control] : has adequate pain control [No Sign of Infection] : is showing no signs of infection [de-identified] : S/P left total knee replacement without lateral release of patella. 2/21/2024. [Steri-Strips Removed & Replaced] : steri-strips removed and replaced [de-identified] : The patient was discharged from the hospital with home physical therapy and home exercises. He verbalized feeling well overall. The patient reports pain of /710. He is taking Tylenol as needed for pain relief. Patient is using EC. Aspirin 81 MG twice a day for DVT prophylaxis. The patient is using Celebrex to reduce inflammation.  [de-identified] : The patient has mild antalgic gait utilizing a cane. He is S/P left total knee replacement. The knee is with Dermabond tape intact. The surgical incision site is without redness, heat or drainage. No palpable hematoma or effusion. No calf tenderness. Positive distal pulses. The active ROM of the left knee is from 5-95 degrees. No evidence of ligament laxity, muscle atrophy, motor or sensory deficit. No flexion contracture or extension lag noted. The left lower extremity is with mild swelling. There is no evidence of infection or cellulitis on the incision site. [de-identified] :  3 views of the left knee were obtained at today's visit. X-rays reveal a well-positioned, well fixed total knee replacement in good alignment. There is no obvious evidence of fractures, dislocation or osteolysis. [de-identified] : S/P left knee replacement. Progressing well. [de-identified] :      Dermabond was removed from the left knee and replaced with Steri-Strips. Patient tolerated it well. Incisional care was reviewed with the patient. Patient was advised on the nature of the healing process and on the importance of adhering to the DVT prophylaxis with Aspirin 81 MG BID for a total of 4 weeks post operative. Patient to continue with physical therapy and home exercises as recommended. Prescription was given for outpatient physical therapy. Patient was encouraged to engage in isometric exercises to assist the knee to come to full extension. He was advised to continue to apply ice to the knee and keep the left lower extremity elevated above the level of the heart to decrease swelling. Prescription was given for antibiotics Amoxicillin for dental prophylaxis as per Dr. Peralta's protocol. He will continue to utilize Tylenol as needed for pain relief and Celebrex to decrease inflammation. Patient to make a follow up appointment to see Dr. Peralta in 6 weeks. He was educated on the signs and symptoms of infection to report. Patient verbalized understanding of all instructions. All   his questions were addressed to his satisfaction. Patient was advised to call this office if he has new questions or concerns. My cumulative time spent on this patient's visit included: Preparation for the visit, review of the medical records, review of pertinent diagnostic studies, examination and counseling of the patient on the above diagnosis, treatment plan and prognosis, orders of diagnostic tests, medications and/or appropriate procedures and documentation in the medical records of today's visit.  Not including time spent for procedures

## 2024-03-21 PROBLEM — F17.200 NICOTINE DEPENDENCE, UNSPECIFIED, UNCOMPLICATED: Chronic | Status: ACTIVE | Noted: 2024-02-05

## 2024-03-21 PROBLEM — K08.89 OTHER SPECIFIED DISORDERS OF TEETH AND SUPPORTING STRUCTURES: Chronic | Status: ACTIVE | Noted: 2024-02-05

## 2024-04-16 ENCOUNTER — APPOINTMENT (OUTPATIENT)
Dept: ORTHOPEDIC SURGERY | Facility: CLINIC | Age: 64
End: 2024-04-16
Payer: MEDICARE

## 2024-04-16 VITALS — SYSTOLIC BLOOD PRESSURE: 124 MMHG | DIASTOLIC BLOOD PRESSURE: 74 MMHG | HEART RATE: 81 BPM

## 2024-04-16 DIAGNOSIS — Z96.652 PRESENCE OF LEFT ARTIFICIAL KNEE JOINT: ICD-10-CM

## 2024-04-16 PROCEDURE — 99024 POSTOP FOLLOW-UP VISIT: CPT

## 2024-04-16 PROCEDURE — 73562 X-RAY EXAM OF KNEE 3: CPT | Mod: LT

## 2024-04-16 NOTE — HISTORY OF PRESENT ILLNESS
[___ Months Post Op] : [unfilled] months post op [6] : the patient reports pain that is 6/10 in severity [Chills] : no chills [Fever] : no fever [Clean/Dry/Intact] : clean, dry and intact [Healed] : healed [Erythema] : not erythematous [Discharge] : absent of discharge [Swelling] : not swollen [Dehiscence] : not dehisced [Neuro Intact] : an unremarkable neurological exam [Vascular Intact] : ~T peripheral vascular exam normal [Negative Nila's] : maneuvers demonstrated a negative Nila's sign [Xray (Date:___)] : [unfilled] Xray -  [Doing Well] : is doing well [Excellent Pain Control] : has excellent pain control [No Sign of Infection] : is showing no signs of infection [de-identified] : Left TKR 2/21/24 [de-identified] : On physical exam of the left knee skin is warm and dry without erythema ecchymosis signs or symptoms of infection superficial or deep, there is a well healed surgical incision.  There is no tenderness to palpation throughout the knee joint.  There is no palpable effusion.  Range of motion is 0-130 without pain or stiffness.  The knee is stable with varus and valgus stress.  There is a negative anterior posterior drawer.  Sensation is intact throughout the distal lower extremity.  Strength is well-maintained in all planes.  There is negative Homans exam.  2+ dorsalis pedis pulse. [de-identified] : 63-year-old male presents for follow-up of the left knee status post left total knee replacement on 2/21/2024.  Overall in the postoperative period patient is doing well.  He states he is continuing to do physical therapy 3 times per week but has finished last week.  He has mild pain for which she is not taking anti-inflammatory pain medications.  Occasionally he will use ice which helps.  He is ambulating freely without any assistive devices.  Overall is happy with how his progress since the surgery. Denies fevers, chills, chest pain, calf pain, shortness of breath. [de-identified] : Radiographs of the left knee were performed today. There are stable interfaces between bone, cement, and implant in all 3 components. There is stable positioning of the femoral, tibial, and patellar components. There is equidistant spacing on each side of the tibial bearing. There is no fracture, foreign body, subsidement, osteolysis, or notable effusion. The patellar component is tracking centrally in the trochlear groove of the femoral component. [de-identified] : Dr. Peralta evaluated this patient, reviewed the radiographs, and is guiding the patients orthopedic management. The patient was advised to continue their routine activities of daily living within tolerances, home exercises as guided by PT, and continue outpatient PT until goals are achieved.  Instructions for LE strengthening, ROM, and balance exercises were reviewed. The patient was advised to follow up with their PCP, if not done already, for a post op medical reevaluation including lab work.  The patient was advised to continue with regular orthopedic follow up post TKR and may come to our office if any new problems arise for urgent orthopedic reevaluation.  All patients questions and concerns were addressed to satisfaction. Advised the patient to continue with antibiotics prior to dental procedures as per Dr. Peralta's protocol.

## (undated) DEVICE — SPECIMEN CONTAINER PET

## (undated) DEVICE — NDL SPINAL 18G X 3.5" (PINK)

## (undated) DEVICE — DRSG DERMABOND PRINEO 60CM

## (undated) DEVICE — DRSG PICO NPWT 4X12"

## (undated) DEVICE — WOUND IRR IRRISEPT W 0.5 CHG

## (undated) DEVICE — SYR LUER LOK 50CC

## (undated) DEVICE — DRSG WEBRIL 6"

## (undated) DEVICE — HANDPIECE INTERPULSE W MULTI TIP

## (undated) DEVICE — PACK TOTAL KNEE

## (undated) DEVICE — ORTHOALIGN PLUS UNIT

## (undated) DEVICE — SUT VICRYL PLUS 2-0 27" CP-1 UNDYED

## (undated) DEVICE — SOL IRR BAG NS 0.9% 3000ML

## (undated) DEVICE — TOURNIQUET CUFF 34" DUAL PORT W PLC

## (undated) DEVICE — WARMING BLANKET UPPER ADULT

## (undated) DEVICE — SUT STRATAFIX SPIRAL MONOCRYL PLUS 3-0 30CM PS-2 UNDYED

## (undated) DEVICE — SOLIDIFIER CANN EXPRESS 3K

## (undated) DEVICE — ELCTR AQUAMANTYS BIPOLAR SEALER 6.0

## (undated) DEVICE — DRSG ACE BANDAGE 6"

## (undated) DEVICE — SOL IRR POUR H2O 1500ML

## (undated) DEVICE — HOOD FLYTE STRYKER HELMET SHIELD

## (undated) DEVICE — VENODYNE/SCD SLEEVE CALF MEDIUM

## (undated) DEVICE — SUT VICRYL PLUS 1 27" CP UNDYED

## (undated) DEVICE — CRYO/CUFF GRAVITY COOLER KNEE LARGE

## (undated) DEVICE — SOL IRR POUR NS 0.9% 500ML

## (undated) DEVICE — ELCTR ROCKER SWITCH PENCIL BLUE 10FT